# Patient Record
Sex: MALE | Race: WHITE | Employment: FULL TIME | URBAN - METROPOLITAN AREA
[De-identification: names, ages, dates, MRNs, and addresses within clinical notes are randomized per-mention and may not be internally consistent; named-entity substitution may affect disease eponyms.]

---

## 2023-05-25 LAB
ERYTHROCYTE DISTRIBUTION WIDTH (RATIO) BY AUTOMATED COUNT: 14.2 % (ref 11.5–14.5)
ERYTHROCYTE MEAN CORPUSCULAR HEMOGLOBIN CONCENTRATION (G/DL) BY AUTOMATED: 31.4 G/DL (ref 32–36)
ERYTHROCYTE MEAN CORPUSCULAR VOLUME (FL) BY AUTOMATED COUNT: 104 FL (ref 80–100)
ERYTHROCYTES (10*6/UL) IN BLOOD BY AUTOMATED COUNT: 2.73 X10E12/L (ref 4.5–5.9)
HEMATOCRIT (%) IN BLOOD BY AUTOMATED COUNT: 28.3 % (ref 41–52)
HEMOGLOBIN (G/DL) IN BLOOD: 8.9 G/DL (ref 13.5–17.5)
LEUKOCYTES (10*3/UL) IN BLOOD BY AUTOMATED COUNT: 6.4 X10E9/L (ref 4.4–11.3)
PLATELETS (10*3/UL) IN BLOOD AUTOMATED COUNT: 233 X10E9/L (ref 150–450)

## 2023-08-30 PROBLEM — I10 HTN (HYPERTENSION): Status: ACTIVE | Noted: 2023-08-30

## 2023-08-30 PROBLEM — H47.20 OPTIC ATROPHY OF RIGHT EYE: Status: ACTIVE | Noted: 2023-08-30

## 2023-08-30 PROBLEM — I48.91 A-FIB (MULTI): Status: ACTIVE | Noted: 2023-08-30

## 2023-08-30 PROBLEM — R70.0 ESR RAISED: Status: ACTIVE | Noted: 2023-08-30

## 2023-08-30 PROBLEM — R52 SHOOTING PAIN: Status: ACTIVE | Noted: 2023-08-30

## 2023-08-30 PROBLEM — K22.70 BARRETTS ESOPHAGUS: Status: ACTIVE | Noted: 2023-08-30

## 2023-08-30 PROBLEM — M31.6 GCA (GIANT CELL ARTERITIS) (MULTI): Status: ACTIVE | Noted: 2023-08-30

## 2023-08-30 PROBLEM — K25.9 GASTRIC ULCER: Status: ACTIVE | Noted: 2023-08-30

## 2023-08-30 PROBLEM — D64.9 ANEMIA: Status: ACTIVE | Noted: 2023-08-30

## 2023-08-30 RX ORDER — ASCORBIC ACID 500 MG
TABLET,CHEWABLE ORAL
COMMUNITY

## 2023-08-30 RX ORDER — KETOCONAZOLE 20 MG/ML
SHAMPOO, SUSPENSION TOPICAL
COMMUNITY
Start: 2023-03-25 | End: 2024-01-09 | Stop reason: WASHOUT

## 2023-08-30 RX ORDER — LANOLIN ALCOHOL/MO/W.PET/CERES
100 CREAM (GRAM) TOPICAL
COMMUNITY
End: 2024-01-09 | Stop reason: WASHOUT

## 2023-08-30 RX ORDER — ALLOPURINOL 100 MG/1
100 TABLET ORAL
COMMUNITY
Start: 2021-02-25

## 2023-08-30 RX ORDER — CARVEDILOL 12.5 MG/1
1 TABLET ORAL 2 TIMES DAILY
COMMUNITY
End: 2024-01-09 | Stop reason: WASHOUT

## 2023-08-30 RX ORDER — INSULIN DETEMIR 100 [IU]/ML
3 INJECTION, SOLUTION SUBCUTANEOUS
COMMUNITY
Start: 2023-02-10

## 2023-08-30 RX ORDER — PEN NEEDLE, DIABETIC 31 GX5/16"
NEEDLE, DISPOSABLE MISCELLANEOUS
COMMUNITY
Start: 2023-02-12

## 2023-08-30 RX ORDER — PANTOPRAZOLE SODIUM 40 MG/1
1 TABLET, DELAYED RELEASE ORAL DAILY
COMMUNITY

## 2023-08-30 RX ORDER — INSULIN ASPART 100 [IU]/ML
INJECTION, SOLUTION INTRAVENOUS; SUBCUTANEOUS
COMMUNITY
Start: 2022-04-17

## 2023-08-30 RX ORDER — SITAGLIPTIN 25 MG/1
1 TABLET, FILM COATED ORAL DAILY
COMMUNITY
Start: 2021-05-28

## 2023-08-30 RX ORDER — ACETAMINOPHEN 325 MG/1
TABLET ORAL
COMMUNITY

## 2023-08-30 RX ORDER — AMLODIPINE BESYLATE 5 MG/1
5 TABLET ORAL
COMMUNITY
Start: 2022-10-05

## 2023-08-30 RX ORDER — CARVEDILOL 6.25 MG/1
12.5 TABLET ORAL 2 TIMES DAILY
COMMUNITY
Start: 2022-01-12

## 2023-08-30 RX ORDER — BLOOD SUGAR DIAGNOSTIC
STRIP MISCELLANEOUS
COMMUNITY
Start: 2021-03-05

## 2023-08-30 RX ORDER — TAMSULOSIN HYDROCHLORIDE 0.4 MG/1
0.4 CAPSULE ORAL
COMMUNITY
End: 2024-01-09 | Stop reason: WASHOUT

## 2023-08-30 RX ORDER — FUROSEMIDE 20 MG/1
20 TABLET ORAL DAILY
COMMUNITY
Start: 2022-10-11

## 2023-10-03 ENCOUNTER — LAB (OUTPATIENT)
Dept: LAB | Facility: LAB | Age: 83
End: 2023-10-03
Payer: MEDICARE

## 2023-10-03 DIAGNOSIS — M31.6 OTHER GIANT CELL ARTERITIS (MULTI): Primary | ICD-10-CM

## 2023-10-03 LAB
CRP SERPL-MCNC: 0.75 MG/DL
ERYTHROCYTE [SEDIMENTATION RATE] IN BLOOD BY WESTERGREN METHOD: 49 MM/H (ref 0–20)

## 2023-10-03 PROCEDURE — 36415 COLL VENOUS BLD VENIPUNCTURE: CPT

## 2023-10-03 PROCEDURE — 86140 C-REACTIVE PROTEIN: CPT

## 2023-10-03 PROCEDURE — 85652 RBC SED RATE AUTOMATED: CPT

## 2023-10-06 ENCOUNTER — OFFICE VISIT (OUTPATIENT)
Dept: OPHTHALMOLOGY | Facility: CLINIC | Age: 83
End: 2023-10-06
Payer: MEDICARE

## 2023-10-06 DIAGNOSIS — H47.20 OPTIC ATROPHY OF RIGHT EYE: Primary | ICD-10-CM

## 2023-10-06 DIAGNOSIS — M31.6 GCA (GIANT CELL ARTERITIS) (MULTI): ICD-10-CM

## 2023-10-06 PROCEDURE — 99214 OFFICE O/P EST MOD 30 MIN: CPT | Performed by: PSYCHIATRY & NEUROLOGY

## 2023-10-06 PROCEDURE — 1160F RVW MEDS BY RX/DR IN RCRD: CPT | Performed by: PSYCHIATRY & NEUROLOGY

## 2023-10-06 PROCEDURE — 92083 EXTENDED VISUAL FIELD XM: CPT | Performed by: PSYCHIATRY & NEUROLOGY

## 2023-10-06 PROCEDURE — 1159F MED LIST DOCD IN RCRD: CPT | Performed by: PSYCHIATRY & NEUROLOGY

## 2023-10-06 PROCEDURE — 92133 CPTRZD OPH DX IMG PST SGM ON: CPT | Performed by: PSYCHIATRY & NEUROLOGY

## 2023-10-06 ASSESSMENT — CONF VISUAL FIELD: OD_INFERIOR_NASAL_RESTRICTION: 1

## 2023-10-06 ASSESSMENT — ENCOUNTER SYMPTOMS
CARDIOVASCULAR NEGATIVE: 0
ENDOCRINE NEGATIVE: 0
HEMATOLOGIC/LYMPHATIC NEGATIVE: 0
RESPIRATORY NEGATIVE: 0
GASTROINTESTINAL NEGATIVE: 0
ALLERGIC/IMMUNOLOGIC NEGATIVE: 0
MUSCULOSKELETAL NEGATIVE: 0
CONSTITUTIONAL NEGATIVE: 0
PSYCHIATRIC NEGATIVE: 0
EYES NEGATIVE: 1
NEUROLOGICAL NEGATIVE: 0

## 2023-10-06 ASSESSMENT — EXTERNAL EXAM - RIGHT EYE: OD_EXAM: NORMAL

## 2023-10-06 ASSESSMENT — CUP TO DISC RATIO
OD_RATIO: 0.1
OS_RATIO: 0.1

## 2023-10-06 ASSESSMENT — VISUAL ACUITY
METHOD: SNELLEN - LINEAR
OD_PH_SC: NO IMPROVEMENT
OS_SC: 20/20
OD_SC: 20/70+2

## 2023-10-06 ASSESSMENT — TONOMETRY
OD_IOP_MMHG: 12
OS_IOP_MMHG: 12
IOP_METHOD: GOLDMANN APPLANATION

## 2023-10-06 ASSESSMENT — SLIT LAMP EXAM - LIDS
COMMENTS: NORMAL
COMMENTS: NORMAL

## 2023-10-06 ASSESSMENT — EXTERNAL EXAM - LEFT EYE: OS_EXAM: NORMAL

## 2023-10-06 NOTE — PROGRESS NOTES
Assessment and Plan    03/17/2022 MRI brain with contrast, which I personally reviewed previously, shows bihemispheric mild white matter FLAIR changes and an area of left parietal cortical encephalomalacia consistent with prior ischemia.    03/07/2022 pathology “RIGHT TEMPORAL ARTERY, BIOPSY: --NEGATIVE FOR ARTERITIS --ATHEROSCLEROSIS”    Lab Results   Component Value Date/Time    SEDRATE 49 (H) 10/03/2023 1131    SEDRATE 26 (H) 02/17/2023 1532    SEDRATE 33 (H) 10/10/2022 1151    SEDRATE 53 (H) 08/31/2022 1350    SEDRATE 25 (H) 06/17/2022 1143    SEDRATE 3 04/08/2022 0600    SEDRATE 10 03/04/2022 1441    SEDRATE 17 02/21/2022 1444    SEDRATE 45 (H) 01/24/2022 1514    CRP 0.75 10/03/2023 1131    CRP 0.18 02/17/2023 1532    CRP 0.93 10/10/2022 1151    CRP 0.59 08/31/2022 1350    CRP 0.35 06/17/2022 1143    CRP 1.56 (A) 04/08/2022 0600    CRP 1.38 (A) 03/04/2022 1441    CRP 0.49 02/21/2022 1444    CRP 0.57 01/24/2022 1514     07/06/2022 ESR 34. CRP 0.43 mg/dL. ([facility])  05/04/2022 ESR 50. CRP 0.35 mg/dL.  04/29/2022 RF <10. MATT negative with negative CALVIN. ANCA, [place]-3, MPO negative.    10/6/2023 OCT RNFL OD 41 & OS 82. (Stable right eye (OD), small decrease left eye (OS))  02/23/2023 OCT RNFL OD 36 & OS 85 (stable OD, about stable OS)  10/27/2022 OCT RNFL OD 36 & OS 88 (lower OD, about stable OS)   07/18/2022 OCT RNFL OD 40 & OS 95. (stable)  06/16/2022 OCT RNFL OD 42 & OS 93. (lower OD progressed to atrophy & higher OS)  03/04/2022 OCT RNFL  & OS 84. (reduced edema OD & stable OS)  02/21/2022 OCT RNFL  & OS 86.    10/6/2023 HVF 24-2 OD fovea 17, generalized depression MD -24.93 & OS fovea 33, rim artifact MD -8.89.   02/23/2023 HVF 24-2 OD fovea <0, generalized depression MD -22.49 & OS fovea 15, scatter MD -0.72.  10/27/2022 HVF 24-2 OD fovea 29, generalized depression with central sparing MD -25.92 & OS fovea 33, scatter MD -2.78   07/18/2022 HVF 24-2 OD stimulus V, fovea 35,  generalized depression & OS fovea 36, scatter MD -2.62.  06/16/2022 HVF 24-2 OD fovea 27, generalized depression MD -25.82 & OS fovea 34, infeiror scatter MD -2.92.  03/04/2022 HVF 24-2 OD stimulus V, fovea 24, generalized, but embeded inferior worse than superior nasal loss  & OS fovea 31, scatter MD -2.43.  02/21/2022 HVF 24-2 OD stimulus V, fovea 28, generalized depression & OS fovea 35, scatter MD -3.43.    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    Findings are similar to previous visits with right optic atrophy and maintained good left eye vision. Recent laboratory studies showed more elevated ESR with normal CRP. I remain concerned for an inflammatory disease such as giant cell arteritis even with negative temporal artery biopsy over non-arteritic anterior ischemic optic neuropathy although he does have small cup/disc ratio and vasculopathic risk factors. However, prednisone was very bad for him, and he has done reasonable well off it. We discussed the option of returning to see a rheumatologist versus monitoring.    Plan    Vasculopathic risk factor control.  ESR & CRP monitoring near next visit.  Defer prednisone after weighing of costs and benefits.  Seek immediate attention for further vision loss.    Follow up in 6-9 months with HVF & OCT. (dilated 02/21/2022)

## 2023-10-06 NOTE — LETTER
October 6, 2023     Honorio Avila MD  [address] [facility] [place] [address]    Patient: Jacob Massey   YOB: 1940   Date of Visit: 10/6/2023     Dear Dr. Honorio Avila MD:    I am writing to share my findings regarding our shared patient Jacob Massey from his visit with me on 10/6/2023.    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    Vision has been stable. He has adjusted more to the vision loss. He awoke with right lower eyelid bruising a few weeks ago that resolved after a few days. This patient reports the following regarding associated symptoms: headaches: more recently happening every few days, but brief and not severe, amaurosis fugax: no, scalp tenderness: some brief times of temple tenderness, jaw claudication: some pain, but not clear claudication pattern, fever: no, proximal myalgias: some baseline pain with times his arms have hurt waking him from sleep, last about 6 weeks ago.   Last edited by Travis Stoll MD PhD on 10/6/2023  2:10 PM.        Diagnoses    Diagnoses and all orders for this visit:  Optic atrophy of right eye (Primary)  -     OCT, Optic Nerve - OU - Both Eyes  -     Marroquin Visual Field - OU - Both Eyes  GCA (giant cell arteritis) (CMS/HCC)  -     OCT, Optic Nerve - OU - Both Eyes  -     Marroquin Visual Field - OU - Both Eyes    Assessment and Plan    03/17/2022 MRI brain with contrast, which I personally reviewed previously, shows bihemispheric mild white matter FLAIR changes and an area of left parietal cortical encephalomalacia consistent with prior ischemia.    03/07/2022 pathology “RIGHT TEMPORAL ARTERY, BIOPSY: --NEGATIVE FOR ARTERITIS --ATHEROSCLEROSIS”    Lab Results   Component  Value Date/Time    SEDRATE 49 (H) 10/03/2023 1131    SEDRATE 26 (H) 02/17/2023 1532    SEDRATE 33 (H) 10/10/2022 1151    SEDRATE 53 (H) 08/31/2022 1350    SEDRATE 25 (H) 06/17/2022 1143    SEDRATE 3 04/08/2022 0600    SEDRATE 10 03/04/2022 1441    SEDRATE 17 02/21/2022 1444    SEDRATE 45 (H) 01/24/2022 1514    CRP 0.75 10/03/2023 1131    CRP 0.18 02/17/2023 1532    CRP 0.93 10/10/2022 1151    CRP 0.59 08/31/2022 1350    CRP 0.35 06/17/2022 1143    CRP 1.56 (A) 04/08/2022 0600    CRP 1.38 (A) 03/04/2022 1441    CRP 0.49 02/21/2022 1444    CRP 0.57 01/24/2022 1514     07/06/2022 ESR 34. CRP 0.43 mg/dL. ([facility])  05/04/2022 ESR 50. CRP 0.35 mg/dL.  04/29/2022 RF <10. MATT negative with negative CALVIN. ANCA, [place]-3, MPO negative.    10/6/2023 OCT RNFL OD 41 & OS 82. (Stable right eye (OD), small decrease left eye (OS))  02/23/2023 OCT RNFL OD 36 & OS 85 (stable OD, about stable OS)  10/27/2022 OCT RNFL OD 36 & OS 88 (lower OD, about stable OS)   07/18/2022 OCT RNFL OD 40 & OS 95. (stable)  06/16/2022 OCT RNFL OD 42 & OS 93. (lower OD progressed to atrophy & higher OS)  03/04/2022 OCT RNFL  & OS 84. (reduced edema OD & stable OS)  02/21/2022 OCT RNFL  & OS 86.    10/6/2023 HVF 24-2 OD fovea 17, generalized depression MD -24.93 & OS fovea 33, rim artifact MD -8.89.   02/23/2023 HVF 24-2 OD fovea <0, generalized depression MD -22.49 & OS fovea 15, scatter MD -0.72.  10/27/2022 HVF 24-2 OD fovea 29, generalized depression with central sparing MD -25.92 & OS fovea 33, scatter MD -2.78   07/18/2022 HVF 24-2 OD stimulus V, fovea 35, generalized depression & OS fovea 36, scatter MD -2.62.  06/16/2022 HVF 24-2 OD fovea 27, generalized depression MD -25.82 & OS fovea 34, infeiror scatter MD -2.92.  03/04/2022 HVF 24-2 OD stimulus V, fovea 24, generalized, but embeded inferior worse than superior nasal loss  & OS fovea 31, scatter MD -2.43.  02/21/2022 HVF 24-2 OD stimulus V, fovea 28, generalized  depression & OS fovea 35, scatter MD -3.43.    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    Findings are similar to previous visits with right optic atrophy and maintained good left eye vision. Recent laboratory studies showed more elevated ESR with normal CRP. I remain concerned for an inflammatory disease such as giant cell arteritis even with negative temporal artery biopsy over non-arteritic anterior ischemic optic neuropathy although he does have small cup/disc ratio and vasculopathic risk factors. However, prednisone was very bad for him, and he has done reasonable well off it. We discussed the option of returning to see a rheumatologist versus monitoring.    Plan    Vasculopathic risk factor control.  ESR & CRP monitoring near next visit.  Defer prednisone after weighing of costs and benefits.  Seek immediate attention for further vision loss.    Follow up in 6-9 months with HVF & OCT. (dilated 02/21/2022)      Below you will find my full examination. I appreciate the opportunity to see Jacob Massey today and to share in his care with you. Please contact me if you have questions for me regarding this visit or if I can be of assistance to another of your patients with neuro-ophthalmological problems.    Sincerely,    Travis Stoll MD PhD    CC:   No Recipients      Base Eye Exam       Visual Acuity (Snellen - Linear)         Right Left    Dist sc 20/70+2 20/20    Dist ph sc no improvement               Tonometry (Goldmann Applanation, 1:14 PM)         Right Left    Pressure 12 12              Pupils         Dark Light Shape React APD    Right 5 3 Round Brisk 1.8 log units    Left 5 3 Round Brisk None              Visual Fields         Left  Right    Restrictions  Total inferior nasal deficiency              Extraocular Movement         Right Left     Full, Ortho Full, Ortho                  Additional Tests       Color         Right Left    Ishihara 0 11                  Slit Lamp and Fundus Exam       External Exam         Right Left    External Normal Normal              Slit Lamp Exam         Right Left    Lids/Lashes Normal Normal    Conjunctiva/Sclera White and quiet White and quiet    Cornea Clear Clear    Anterior Chamber Deep and quiet Deep and quiet    Iris Round and reactive Round and reactive    Lens Posterior chamber intraocular lens Posterior chamber intraocular lens    Anterior Vitreous Normal Normal              Fundus Exam         Right Left    Disc Pallor Normal    C/D Ratio 0.1 0.1    Macula Normal Normal    Vessels Normal Normal    Periphery Normal Normal

## 2023-11-21 PROBLEM — C61 MALIGNANT NEOPLASM OF PROSTATE (MULTI): Status: ACTIVE | Noted: 2017-03-15

## 2023-11-21 PROBLEM — I10 BENIGN ESSENTIAL HYPERTENSION: Status: ACTIVE | Noted: 2017-03-15

## 2024-01-09 ENCOUNTER — OFFICE VISIT (OUTPATIENT)
Dept: CARDIOLOGY | Facility: CLINIC | Age: 84
End: 2024-01-09
Payer: MEDICARE

## 2024-01-09 VITALS
WEIGHT: 203 LBS | BODY MASS INDEX: 29.06 KG/M2 | SYSTOLIC BLOOD PRESSURE: 160 MMHG | DIASTOLIC BLOOD PRESSURE: 70 MMHG | HEART RATE: 69 BPM | HEIGHT: 70 IN

## 2024-01-09 DIAGNOSIS — D64.9 ANEMIA, UNSPECIFIED TYPE: ICD-10-CM

## 2024-01-09 DIAGNOSIS — I10 HYPERTENSION, UNSPECIFIED TYPE: ICD-10-CM

## 2024-01-09 DIAGNOSIS — I48.0 PAROXYSMAL ATRIAL FIBRILLATION (MULTI): Primary | ICD-10-CM

## 2024-01-09 DIAGNOSIS — I10 BENIGN ESSENTIAL HYPERTENSION: ICD-10-CM

## 2024-01-09 PROCEDURE — 1159F MED LIST DOCD IN RCRD: CPT | Performed by: INTERNAL MEDICINE

## 2024-01-09 PROCEDURE — 3078F DIAST BP <80 MM HG: CPT | Performed by: INTERNAL MEDICINE

## 2024-01-09 PROCEDURE — 3077F SYST BP >= 140 MM HG: CPT | Performed by: INTERNAL MEDICINE

## 2024-01-09 PROCEDURE — 93000 ELECTROCARDIOGRAM COMPLETE: CPT | Performed by: INTERNAL MEDICINE

## 2024-01-09 PROCEDURE — 1036F TOBACCO NON-USER: CPT | Performed by: INTERNAL MEDICINE

## 2024-01-09 PROCEDURE — 99214 OFFICE O/P EST MOD 30 MIN: CPT | Performed by: INTERNAL MEDICINE

## 2024-01-09 NOTE — PROGRESS NOTES
Chief Complaint:   No chief complaint on file.     History Of Present Illness:    Jacob Massey is a 84 y.o. male presenting for yearly follow up.  H/o pAfib, HTN, CKD, colon CA with partial colectomy, GIB in 2022 (Hb 6.7, required 4u pRBC) who presents for follow up.    Doing well from a CV perspective - no chest pain/pressure, SOB, HERNANDEZ.  He was having some swelling in the ankles but his is gone.  Denies any significant sx from Afib - no long episodes of palpitations or fast heart rates.  Denies any blood in the stools or in the urine.  Has had a few nosebleeds but these do stop with manual compression.  Seeing his PCP Dr. Avila next week and will be having blood testing that visit.  Checks his blood pressure twice a day - this morning his BP at home was 127/  In general his blood pressures can be labile - tells me that his nephrologist is following this closely.    Unfortunately he does not have his medication list with him.       CV testing reviewed :  2/2023  BMP  k 4.4 BUN 53, crea 2.50    CBC    H&H 8.9/28.3  4/2022 TTE   EF 60-65% stage I DD, left atrium moderately dilated,     ast Recorded Vitals:  There were no vitals filed for this visit.    Past Medical History:  He has no past medical history on file.    Past Surgical History:  He has a past surgical history that includes Other surgical history (02/21/2022); Other surgical history (02/21/2022); and CT angio abdomen pelvis w and or wo IV IV contrast (4/6/2022).      Social History:  He has no history on file for tobacco use, alcohol use, and drug use.    Family History:  Family History   Problem Relation Name Age of Onset    Colon cancer Father          Allergies:  Tetracycline, Adhesive tape-silicones, and Penicillin    Outpatient Medications:  Current Outpatient Medications   Medication Instructions    acetaminophen (TylenoL) 325 mg tablet oral    allopurinol (ZYLOPRIM) 100 mg, oral    amLODIPine (NORVASC) 5 mg, oral    amlodipine-valsartan  "(Exforge) 5-160 mg tablet 1 tablet, oral, Daily    apixaban (Eliquis) 2.5 mg tablet 1 tablet, oral, 2 times daily    ascorbic acid (Vitamin C) 500 mg chewable tablet oral    BD Ultra-Fine Short Pen Needle 31 gauge x 5/16\" needle     carvedilol (Coreg) 12.5 mg tablet 1 tablet, oral, 2 times daily    carvedilol (COREG) 12.5 mg, oral, 2 times daily    fluocinonide (Lidex) 0.05 % external solution 1 APPLICATION EXTERNALLY ONCE A DAY TO SCALP ITCH AS NEEDED 30 DAYS    furosemide (LASIX) 20 mg, oral, Daily    insulin aspart (NovoLOG U-100 Insulin aspart) 100 unit/mL injection PLEASE SEE ATTACHED FOR DETAILED DIRECTIONS    Januvia 25 mg tablet 1 tablet, oral, Daily    ketoconazole (NIZOral) 2 % shampoo     Levemir FlexTouch U100 Insulin 100 unit/mL (3 mL) pen 3 mL, subcutaneous    NON FORMULARY oral, Vitamin D TABS    OneTouch Ultra Test strip USE TO TEST BLOOD SUGAR ONCE A DAY    pantoprazole (ProtoNix) 40 mg EC tablet 1 tablet, oral, Daily    tamsulosin (FLOMAX) 0.4 mg, oral    thiamine (VITAMIN B-1) 100 mg, oral    triamcinolone (Kenalog) 0.1 % cream PLEASE SEE ATTACHED FOR DETAILED DIRECTIONS    VITAMIN A ORAL oral       Physical Exam:  Physical Exam  HENT:      Head: Normocephalic.      Nose: Nose normal.      Mouth/Throat:      Mouth: Mucous membranes are moist.   Cardiovascular:      Rate and Rhythm: Normal rate and regular rhythm.      Comments: No carotid bruits  Pulmonary:      Effort: Pulmonary effort is normal.      Breath sounds: Normal breath sounds.   Abdominal:      Palpations: Abdomen is soft.   Musculoskeletal:         General: Normal range of motion.      Cervical back: Normal range of motion.   Skin:     General: Skin is warm and dry.   Neurological:      General: No focal deficit present.      Mental Status: He is alert.   Psychiatric:         Mood and Affect: Mood normal.           Last Labs:  CBC -  Lab Results   Component Value Date    WBC 6.4 05/25/2023    HGB 8.9 (L) 05/25/2023    HCT 28.3 (L) " "05/25/2023     (H) 05/25/2023     05/25/2023       CMP -  Lab Results   Component Value Date    CALCIUM 8.8 02/17/2023    PHOS 3.8 02/17/2023    PROT 4.6 (L) 04/16/2022    ALBUMIN 4.0 02/17/2023    AST 9 04/16/2022    ALT 11 04/16/2022    ALKPHOS 70 04/16/2022    BILITOT 0.4 04/16/2022       LIPID PANEL -   No results found for: \"CHOL\", \"TRIG\", \"HDL\", \"CHHDL\", \"LDLF\", \"VLDL\", \"NHDL\"    RENAL FUNCTION PANEL -   Lab Results   Component Value Date    GLUCOSE 132 (H) 02/17/2023     02/17/2023    K 4.4 02/17/2023     (H) 02/17/2023    CO2 22 02/17/2023    ANIONGAP 16 02/17/2023    BUN 53 (H) 02/17/2023    CREATININE 2.50 (H) 02/17/2023    GFRMALE 25 (A) 02/17/2023    CALCIUM 8.8 02/17/2023    PHOS 3.8 02/17/2023    ALBUMIN 4.0 02/17/2023        Lab Results   Component Value Date    HGBA1C 5.9 (A) 02/17/2023       Assessment/Plan   Problem List Items Addressed This Visit             ICD-10-CM       Cardiac and Vasculature    A-fib (CMS/HCC) - Primary I48.91     Denies significant Afib symptoms, no long episodes of palps or dizziness.  EKG today is sinus rhythm with first deg AVB and RBBB, HR 64.  -continue rate control with BB  -apixaban 2.5mg bid         HTN (hypertension) I10    Benign essential hypertension I10     Labile blood pressures - telling me this morning his SBP was 127, but this afternoon it is in the 160's.      -to have his wife call us with his blood pressure log and accurate medication list            Hematology and Neoplasia    Anemia D64.9     Prior history of anemia in 2022 requiring 4u pRBC.  No recent H/H in our system - 5/2023, Hb 8.9    -telling me he will be having bloodwork soon when seen by his PCP Dr. Avila  -also we will give him out fax number to obtain most recent bloodwork                  "

## 2024-01-09 NOTE — PATIENT INSTRUCTIONS
Thanks for following up in office today.    1)  Before we make any changes to your medications.  I want your wife to call with what medications you are currently taking along with a list of your blood pressure readings for the last week.      2)  I am going to keep you on the eliquis for now  but you need to let me know if you are having any bleeding.    Your last blood test that shows your blood count was in  May 2023 and it was low.  Please have your last blood count test faxed to our office [phone].   3)  Please continue your cardiac medications as prescribed.    Follow up with --- in --- months  If you have any questions, please call [phone] and choose option for Dr. Aguirre's nurse Heidi Jordan

## 2024-01-09 NOTE — ASSESSMENT & PLAN NOTE
Labile blood pressures - telling me this morning his SBP was 127, but this afternoon it is in the 160's.      -to have his wife call us with his blood pressure log and accurate medication list

## 2024-01-09 NOTE — ASSESSMENT & PLAN NOTE
Denies significant Afib symptoms, no long episodes of palps or dizziness.  EKG today is sinus rhythm with first deg AVB and RBBB, HR 64.  -continue rate control with BB  -apixaban 2.5mg bid

## 2024-01-09 NOTE — ASSESSMENT & PLAN NOTE
Prior history of anemia in 2022 requiring 4u pRBC.  No recent H/H in our system - 5/2023, Hb 8.9    -telling me he will be having bloodwork soon when seen by his PCP Dr. Avila  -also we will give him out fax number to obtain most recent bloodwork

## 2024-06-18 PROBLEM — D63.1 ANEMIA IN STAGE 4 CHRONIC KIDNEY DISEASE (MULTI): Status: ACTIVE | Noted: 2024-06-18

## 2024-06-18 PROBLEM — N18.4 CKD (CHRONIC KIDNEY DISEASE), STAGE IV (MULTI): Status: ACTIVE | Noted: 2024-06-18

## 2024-06-18 PROBLEM — N18.4 ANEMIA IN STAGE 4 CHRONIC KIDNEY DISEASE (MULTI): Status: ACTIVE | Noted: 2024-06-18

## 2024-06-18 RX ORDER — HEPARIN 100 UNIT/ML
500 SYRINGE INTRAVENOUS AS NEEDED
Status: CANCELLED | OUTPATIENT
Start: 2024-06-19

## 2024-06-18 RX ORDER — HEPARIN SODIUM,PORCINE/PF 10 UNIT/ML
50 SYRINGE (ML) INTRAVENOUS AS NEEDED
Status: CANCELLED | OUTPATIENT
Start: 2024-06-19

## 2024-06-19 ENCOUNTER — INFUSION (OUTPATIENT)
Dept: INFUSION THERAPY | Facility: HOSPITAL | Age: 84
End: 2024-06-19
Payer: MEDICARE

## 2024-06-19 VITALS
OXYGEN SATURATION: 98 % | SYSTOLIC BLOOD PRESSURE: 132 MMHG | TEMPERATURE: 98 F | RESPIRATION RATE: 16 BRPM | HEART RATE: 66 BPM | DIASTOLIC BLOOD PRESSURE: 80 MMHG

## 2024-06-19 DIAGNOSIS — D63.1 ANEMIA IN STAGE 4 CHRONIC KIDNEY DISEASE (MULTI): Primary | ICD-10-CM

## 2024-06-19 DIAGNOSIS — N18.4 CKD (CHRONIC KIDNEY DISEASE), STAGE IV (MULTI): ICD-10-CM

## 2024-06-19 DIAGNOSIS — N18.4 ANEMIA IN STAGE 4 CHRONIC KIDNEY DISEASE (MULTI): Primary | ICD-10-CM

## 2024-06-19 PROCEDURE — 96365 THER/PROPH/DIAG IV INF INIT: CPT | Mod: INF

## 2024-06-19 PROCEDURE — 2500000004 HC RX 250 GENERAL PHARMACY W/ HCPCS (ALT 636 FOR OP/ED): Mod: JZ | Performed by: INTERNAL MEDICINE

## 2024-06-19 RX ORDER — HEPARIN SODIUM,PORCINE/PF 10 UNIT/ML
50 SYRINGE (ML) INTRAVENOUS AS NEEDED
OUTPATIENT
Start: 2024-06-19

## 2024-06-19 RX ORDER — HEPARIN 100 UNIT/ML
500 SYRINGE INTRAVENOUS AS NEEDED
OUTPATIENT
Start: 2024-06-19

## 2024-06-19 ASSESSMENT — COLUMBIA-SUICIDE SEVERITY RATING SCALE - C-SSRS
2. HAVE YOU ACTUALLY HAD ANY THOUGHTS OF KILLING YOURSELF?: NO
6. HAVE YOU EVER DONE ANYTHING, STARTED TO DO ANYTHING, OR PREPARED TO DO ANYTHING TO END YOUR LIFE?: NO
1. IN THE PAST MONTH, HAVE YOU WISHED YOU WERE DEAD OR WISHED YOU COULD GO TO SLEEP AND NOT WAKE UP?: NO

## 2024-06-19 ASSESSMENT — PATIENT HEALTH QUESTIONNAIRE - PHQ9
1. LITTLE INTEREST OR PLEASURE IN DOING THINGS: NOT AT ALL
SUM OF ALL RESPONSES TO PHQ9 QUESTIONS 1 AND 2: 0
2. FEELING DOWN, DEPRESSED OR HOPELESS: NOT AT ALL

## 2024-06-19 ASSESSMENT — PAIN SCALES - GENERAL: PAINLEVEL: 0-NO PAIN

## 2024-06-19 ASSESSMENT — ENCOUNTER SYMPTOMS
DEPRESSION: 0
LOSS OF SENSATION IN FEET: 0
OCCASIONAL FEELINGS OF UNSTEADINESS: 0

## 2024-06-26 ENCOUNTER — INFUSION (OUTPATIENT)
Dept: INFUSION THERAPY | Facility: HOSPITAL | Age: 84
End: 2024-06-26
Payer: MEDICARE

## 2024-06-26 VITALS
RESPIRATION RATE: 18 BRPM | HEART RATE: 77 BPM | SYSTOLIC BLOOD PRESSURE: 115 MMHG | TEMPERATURE: 98.2 F | OXYGEN SATURATION: 98 % | DIASTOLIC BLOOD PRESSURE: 66 MMHG

## 2024-06-26 DIAGNOSIS — D63.1 ANEMIA IN STAGE 4 CHRONIC KIDNEY DISEASE (MULTI): ICD-10-CM

## 2024-06-26 DIAGNOSIS — N18.4 ANEMIA IN STAGE 4 CHRONIC KIDNEY DISEASE (MULTI): ICD-10-CM

## 2024-06-26 PROCEDURE — 2500000004 HC RX 250 GENERAL PHARMACY W/ HCPCS (ALT 636 FOR OP/ED): Performed by: INTERNAL MEDICINE

## 2024-06-26 PROCEDURE — 96365 THER/PROPH/DIAG IV INF INIT: CPT | Mod: INF

## 2024-06-26 ASSESSMENT — COLUMBIA-SUICIDE SEVERITY RATING SCALE - C-SSRS
2. HAVE YOU ACTUALLY HAD ANY THOUGHTS OF KILLING YOURSELF?: NO
1. IN THE PAST MONTH, HAVE YOU WISHED YOU WERE DEAD OR WISHED YOU COULD GO TO SLEEP AND NOT WAKE UP?: NO
6. HAVE YOU EVER DONE ANYTHING, STARTED TO DO ANYTHING, OR PREPARED TO DO ANYTHING TO END YOUR LIFE?: NO

## 2024-06-26 ASSESSMENT — ENCOUNTER SYMPTOMS
DEPRESSION: 0
LOSS OF SENSATION IN FEET: 0
OCCASIONAL FEELINGS OF UNSTEADINESS: 0

## 2024-06-26 ASSESSMENT — PATIENT HEALTH QUESTIONNAIRE - PHQ9
2. FEELING DOWN, DEPRESSED OR HOPELESS: NOT AT ALL
SUM OF ALL RESPONSES TO PHQ9 QUESTIONS 1 AND 2: 0
1. LITTLE INTEREST OR PLEASURE IN DOING THINGS: NOT AT ALL

## 2024-06-28 RX ORDER — CARVEDILOL 12.5 MG/1
TABLET ORAL
COMMUNITY
Start: 2024-05-10

## 2024-06-28 RX ORDER — TRIAMCINOLONE ACETONIDE 1 MG/G
CREAM TOPICAL
COMMUNITY

## 2024-06-28 RX ORDER — KETOCONAZOLE 20 MG/ML
SHAMPOO, SUSPENSION TOPICAL
COMMUNITY

## 2024-06-28 RX ORDER — CHOLECALCIFEROL (VITAMIN D3) 50 MCG
2000 TABLET ORAL DAILY
COMMUNITY

## 2024-06-28 RX ORDER — FERROUS SULFATE 325(65) MG
325 TABLET ORAL 2 TIMES DAILY
COMMUNITY

## 2024-06-28 RX ORDER — FLASH GLUCOSE SENSOR
KIT MISCELLANEOUS
COMMUNITY
Start: 2024-05-16

## 2024-07-05 ENCOUNTER — LAB (OUTPATIENT)
Dept: LAB | Facility: LAB | Age: 84
End: 2024-07-05
Payer: MEDICARE

## 2024-07-05 DIAGNOSIS — H47.20 OPTIC ATROPHY OF RIGHT EYE: ICD-10-CM

## 2024-07-05 DIAGNOSIS — M31.6 GCA (GIANT CELL ARTERITIS) (MULTI): ICD-10-CM

## 2024-07-05 LAB
CRP SERPL-MCNC: 2.53 MG/DL
ERYTHROCYTE [SEDIMENTATION RATE] IN BLOOD BY WESTERGREN METHOD: 24 MM/H (ref 0–20)

## 2024-07-05 PROCEDURE — 82607 VITAMIN B-12: CPT

## 2024-07-05 PROCEDURE — 82746 ASSAY OF FOLIC ACID SERUM: CPT

## 2024-07-05 PROCEDURE — 86140 C-REACTIVE PROTEIN: CPT

## 2024-07-05 PROCEDURE — 36415 COLL VENOUS BLD VENIPUNCTURE: CPT

## 2024-07-05 PROCEDURE — 85652 RBC SED RATE AUTOMATED: CPT

## 2024-07-08 NOTE — PROGRESS NOTES
Assessment and Plan    03/17/2022 MRI brain with contrast, which I personally reviewed previously, shows bihemispheric mild white matter FLAIR changes and an area of left parietal cortical encephalomalacia consistent with prior ischemia.    03/07/2022 pathology “RIGHT TEMPORAL ARTERY, BIOPSY: --NEGATIVE FOR ARTERITIS --ATHEROSCLEROSIS”    Lab Results   Component Value Date/Time    SEDRATE 24 (H) 07/05/2024 1524    SEDRATE 49 (H) 10/03/2023 1131    SEDRATE 26 (H) 02/17/2023 1532    SEDRATE 33 (H) 10/10/2022 1151    SEDRATE 53 (H) 08/31/2022 1350    SEDRATE 25 (H) 06/17/2022 1143    SEDRATE 3 04/08/2022 0600    SEDRATE 10 03/04/2022 1441    SEDRATE 17 02/21/2022 1444    SEDRATE 45 (H) 01/24/2022 1514    CRP 2.53 (H) 07/05/2024 1524    CRP 0.75 10/03/2023 1131    CRP 0.18 02/17/2023 1532    CRP 0.93 10/10/2022 1151    CRP 0.59 08/31/2022 1350    CRP 0.35 06/17/2022 1143    CRP 1.56 (A) 04/08/2022 0600    CRP 1.38 (A) 03/04/2022 1441    CRP 0.49 02/21/2022 1444    CRP 0.57 01/24/2022 1514     07/06/2022 ESR 34. CRP 0.43 mg/dL. ([facility])  05/04/2022 ESR 50. CRP 0.35 mg/dL.  04/29/2022 RF <10. MATT negative with negative CALVIN. ANCA, [place]-3, MPO negative.    07/09/2024 OCT RNFL OD 34 & OS 83. (Stable)  10/06/2023 OCT RNFL OD 41 & OS 82. (Stable right eye (OD), small decrease left eye (OS))  02/23/2023 OCT RNFL OD 36 & OS 85 (stable OD, about stable OS)  10/27/2022 OCT RNFL OD 36 & OS 88 (lower OD, about stable OS)   07/18/2022 OCT RNFL OD 40 & OS 95. (stable)  06/16/2022 OCT RNFL OD 42 & OS 93. (lower OD progressed to atrophy & higher OS)  03/04/2022 OCT RNFL  & OS 84. (reduced edema OD & stable OS)  02/21/2022 OCT RNFL  & OS 86.    07/09/2024 HVF 24-2 OD stimulus V, fovea 28, inferior altitudinal & superior arcuate & OS fovea 37, wnl MD -0.17.  10/06/2023 HVF 24-2 OD fovea 17, generalized depression MD -24.93 & OS fovea 33, rim artifact MD -8.89.   02/23/2023 HVF 24-2 OD fovea <0,  generalized depression MD -22.49 & OS fovea 15, scatter MD -0.72.  10/27/2022 HVF 24-2 OD fovea 29, generalized depression with central sparing MD -25.92 & OS fovea 33, scatter MD -2.78   07/18/2022 HVF 24-2 OD stimulus V, fovea 35, generalized depression & OS fovea 36, scatter MD -2.62.  06/16/2022 HVF 24-2 OD fovea 27, generalized depression MD -25.82 & OS fovea 34, infeiror scatter MD -2.92.  03/04/2022 HVF 24-2 OD stimulus V, fovea 24, generalized, but embeded inferior worse than superior nasal loss  & OS fovea 31, scatter MD -2.43.  02/21/2022 HVF 24-2 OD stimulus V, fovea 28, generalized depression & OS fovea 35, scatter MD -3.43.    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    He has similar poor right eye vision with a large relative afferent pupillary defect and optic atrophy. Recent laboratory studies again show inflammatory marker elevation, now with C-reactive protein elevated. I had been concerned about giant cell arteritis and arteritic anterior ischemic optic neuropathy given pallid edema at onset, but pallid edema also can occur in non-arteritic anterior ischemic optic neuropathy, particularly in cases of chronic kidney disease, although seen with end stage kidney disease in my experience and reading. Anemia also is pre-disposing to non-arteritic anterior ischemic optic neuropathy. I think the prolonged time of not developing other giant cell arteritis findings makes it unlikely, but I still have concerns for this event as an atypical non-arteritic anterior ischemic optic neuropathy. I recommend evaluation of the anemia in particular. Although anemia of chronic disease is possible, the high MCV raises other concerns for a nutritional  etiology. I also had not looked in the past, but carotid evaluation is worthwhile, too.    Plan    Evaluate and treat anemia, checking B12 and folate.  Hematology referral.  Check carotid duplex ultrasound.  Vasculopathic risk factor control.  Seek immediate attention for further vision loss.    Follow up in 6-9 months with HVF & OCT. (dilated 7/9/2024)

## 2024-07-09 ENCOUNTER — APPOINTMENT (OUTPATIENT)
Dept: OPHTHALMOLOGY | Facility: CLINIC | Age: 84
End: 2024-07-09
Payer: MEDICARE

## 2024-07-09 DIAGNOSIS — H47.20 OPTIC ATROPHY OF RIGHT EYE: Primary | ICD-10-CM

## 2024-07-09 DIAGNOSIS — Z09 ENCOUNTER FOR FOLLOW-UP EXAMINATION AFTER COMPLETED TREATMENT FOR CONDITIONS OTHER THAN MALIGNANT NEOPLASM: ICD-10-CM

## 2024-07-09 LAB
FOLATE SERPL-MCNC: >22.3 NG/ML
VIT B12 SERPL-MCNC: 875 PG/ML (ref 211–911)

## 2024-07-09 PROCEDURE — 99214 OFFICE O/P EST MOD 30 MIN: CPT | Performed by: PSYCHIATRY & NEUROLOGY

## 2024-07-09 PROCEDURE — 92133 CPTRZD OPH DX IMG PST SGM ON: CPT | Performed by: PSYCHIATRY & NEUROLOGY

## 2024-07-09 PROCEDURE — 92083 EXTENDED VISUAL FIELD XM: CPT | Performed by: PSYCHIATRY & NEUROLOGY

## 2024-07-09 ASSESSMENT — ENCOUNTER SYMPTOMS
CONSTITUTIONAL NEGATIVE: 0
EYES NEGATIVE: 1
ALLERGIC/IMMUNOLOGIC NEGATIVE: 0
NEUROLOGICAL NEGATIVE: 0
PSYCHIATRIC NEGATIVE: 0
MUSCULOSKELETAL NEGATIVE: 0
RESPIRATORY NEGATIVE: 0
HEMATOLOGIC/LYMPHATIC NEGATIVE: 0
GASTROINTESTINAL NEGATIVE: 0
ENDOCRINE NEGATIVE: 0
CARDIOVASCULAR NEGATIVE: 0

## 2024-07-09 ASSESSMENT — CONF VISUAL FIELD
OD_INFERIOR_NASAL_RESTRICTION: 1
OS_NORMAL: 1
OS_SUPERIOR_NASAL_RESTRICTION: 0
OS_SUPERIOR_TEMPORAL_RESTRICTION: 0
OS_INFERIOR_TEMPORAL_RESTRICTION: 0
OS_INFERIOR_NASAL_RESTRICTION: 0

## 2024-07-09 ASSESSMENT — CUP TO DISC RATIO
OD_RATIO: 0.1
OS_RATIO: 0.1

## 2024-07-09 ASSESSMENT — VISUAL ACUITY
OD_SC: 20/100
METHOD: SNELLEN - LINEAR
OS_SC: 20/20

## 2024-07-09 ASSESSMENT — EXTERNAL EXAM - LEFT EYE: OS_EXAM: NORMAL

## 2024-07-09 ASSESSMENT — EXTERNAL EXAM - RIGHT EYE: OD_EXAM: NORMAL

## 2024-07-09 ASSESSMENT — TONOMETRY
IOP_METHOD: GOLDMANN APPLANATION
OS_IOP_MMHG: 14
OD_IOP_MMHG: 14

## 2024-07-09 ASSESSMENT — SLIT LAMP EXAM - LIDS
COMMENTS: NORMAL
COMMENTS: NORMAL

## 2024-07-11 ENCOUNTER — APPOINTMENT (OUTPATIENT)
Dept: CARDIOLOGY | Facility: CLINIC | Age: 84
End: 2024-07-11
Payer: MEDICARE

## 2024-07-11 VITALS
SYSTOLIC BLOOD PRESSURE: 114 MMHG | HEART RATE: 80 BPM | DIASTOLIC BLOOD PRESSURE: 56 MMHG | HEIGHT: 70 IN | BODY MASS INDEX: 27.77 KG/M2 | OXYGEN SATURATION: 99 % | WEIGHT: 194 LBS

## 2024-07-11 DIAGNOSIS — I48.0 PAROXYSMAL ATRIAL FIBRILLATION (MULTI): Primary | ICD-10-CM

## 2024-07-11 DIAGNOSIS — D64.9 ANEMIA, UNSPECIFIED TYPE: ICD-10-CM

## 2024-07-11 DIAGNOSIS — I10 BENIGN ESSENTIAL HYPERTENSION: ICD-10-CM

## 2024-07-11 DIAGNOSIS — I10 HYPERTENSION, UNSPECIFIED TYPE: ICD-10-CM

## 2024-07-11 PROCEDURE — 1036F TOBACCO NON-USER: CPT | Performed by: PHYSICIAN ASSISTANT

## 2024-07-11 PROCEDURE — 3074F SYST BP LT 130 MM HG: CPT | Performed by: PHYSICIAN ASSISTANT

## 2024-07-11 PROCEDURE — 1160F RVW MEDS BY RX/DR IN RCRD: CPT | Performed by: PHYSICIAN ASSISTANT

## 2024-07-11 PROCEDURE — 1159F MED LIST DOCD IN RCRD: CPT | Performed by: PHYSICIAN ASSISTANT

## 2024-07-11 PROCEDURE — 99214 OFFICE O/P EST MOD 30 MIN: CPT | Performed by: PHYSICIAN ASSISTANT

## 2024-07-11 PROCEDURE — 3078F DIAST BP <80 MM HG: CPT | Performed by: PHYSICIAN ASSISTANT

## 2024-07-11 ASSESSMENT — ENCOUNTER SYMPTOMS
DYSURIA: 0
ABDOMINAL PAIN: 0
NAUSEA: 0
WHEEZING: 0
FEVER: 0
SHORTNESS OF BREATH: 1
PALPITATIONS: 0
DIARRHEA: 0
DYSPNEA ON EXERTION: 1
VOMITING: 0
ORTHOPNEA: 0
WEAKNESS: 0

## 2024-07-11 NOTE — PROGRESS NOTES
Cardiology Follow Up  Chief Complaint:   Patient is here for 6 month office visit.      History Of Present Illness:    Jacob Massey is a 84 y.o. male presenting for yearly follow up.  H/o pAfib, HTN, CKD, colon CA with partial colectomy, GIB in 2022 (Hb 6.7, required 4u pRBC) who presents for follow up.   Doing well from a CV perspective - no chest pain/pressure, SOB, HERNANDEZ.  He was having some swelling in the ankles but his is gone.  Denies any significant sx from Afib - no long episodes of palpitations or fast heart rates.  Denies any blood in the stools or in the urine.  Has had a few nosebleeds but these do stop with manual compression.  Seeing his PCP Dr. Avila next week and will be having blood testing that visit.  Checks his blood pressure twice a day - this morning his BP at home was 127/  In general his blood pressures can be labile - tells me that his nephrologist is following this closely.    Unfortunately he does not have his medication list with him.       CV testing reviewed :  2/2023  BMP  k 4.4 BUN 53, crea 2.50    CBC    H&H 8.9/28.3  4/2022 TTE   EF 60-65% stage I DD, left atrium moderately dilated,   7-11-24: This is a 84-year-old patient known to Dr. Aguirre.  Above history as noted.  Presents with his wife today for 6-month follow-up.  Patient more recently has just been very frustrated as he is feeling very weak, fatigued and has no energy.  Because of this his activity level has plummeted and he admits to being very inactive.  He is not having any specific chest pain or symptoms of atrial fibrillation.  He does have a history of GI bleeding going back to 2022 for which she needed to have a partial colectomy.  Patient subsequently was found to have gastric and duodenal ulcers but on repeat EGD in June 2022 those had healed but he had significant esophagitis.  Now the patient tells me that despite iron infusions his blood counts continue to be low and in the last couple of weeks he has had  "episodes of bright red blood per rectum like he had had previously.  Again no specific cardiac complaints.  He is on Eliquis low-dose twice daily and his last hemoglobin as best I can tell was 9.7.     Last Recorded Vitals:  Vitals:    07/11/24 1222   BP: 114/56   BP Location: Left arm   Patient Position: Sitting   BP Cuff Size: Adult   Pulse: 80   SpO2: 99%   Weight: 88 kg (194 lb)   Height: 1.778 m (5' 10\")       Past Medical History:  He has a past medical history of Anemia, Atrial fibrillation (Multi), Chronic kidney disease, CKD (chronic kidney disease), stage IV (Multi), Colon cancer (Multi), Diabetes mellitus (Multi), GERD (gastroesophageal reflux disease), Hypertension, Prostate cancer (Multi), and Skin cancer.    Past Surgical History:  He has a past surgical history that includes Other surgical history (02/21/2022); Other surgical history (02/21/2022); CT angio abdomen pelvis w and or wo IV IV contrast (04/06/2022); Cataract extraction; Tonsillectomy; Cholecystectomy; Colonoscopy; and Upper gastrointestinal endoscopy.      Social History:  He reports that he has quit smoking. His smoking use included cigarettes. He has never used smokeless tobacco. He reports that he does not currently use alcohol. He reports that he does not use drugs.    Family History:  Family History   Problem Relation Name Age of Onset    Colon cancer Father          Allergies:  Tetracycline, Adhesive tape-silicones, Penicillin, Penicillin v potassium, and Tetracycline hcl    Outpatient Medications:  Current Outpatient Medications   Medication Instructions    acetaminophen (TylenoL) 325 mg tablet oral    allopurinol (ZYLOPRIM) 100 mg, oral    amLODIPine (NORVASC) 5 mg, oral    amlodipine-valsartan (Exforge) 5-160 mg tablet 1 tablet, oral, Daily    apixaban (Eliquis) 2.5 mg tablet 1 tablet, oral, 2 times daily    ascorbic acid (Vitamin C) 500 mg chewable tablet oral    carvedilol (Coreg) 12.5 mg tablet take 1 tablet by mouth twice a " day with food for 90 days    carvedilol (COREG) 12.5 mg, oral, 2 times daily    cholecalciferol (VITAMIN D-3) 2,000 Units, oral, Daily    ferrous sulfate (325 mg ferrous sulfate) 325 mg, oral, 2 times daily    FreeStyle Tal 2 Sensor kit as directed    furosemide (LASIX) 20 mg, oral, Daily    Januvia 25 mg tablet 1 tablet, oral, Daily    ketoconazole (NIZOral) 2 % shampoo as directed Externally 2-3 x weekly as needed for 30 days    OneTouch Ultra Test strip USE TO TEST BLOOD SUGAR ONCE A DAY    pantoprazole (ProtoNix) 40 mg EC tablet 1 tablet, oral, Daily    triamcinolone (Kenalog) 0.1 % cream 1 application to affected area Externally Twice a day as needed for active rash on body, not face, use sporadically contains steroid for 30 days    VITAMIN A ORAL oral     Review of Systems   Constitutional: Negative for fever and malaise/fatigue.        Fatigued/very inactive   Cardiovascular:  Positive for dyspnea on exertion. Negative for chest pain, orthopnea and palpitations.   Respiratory:  Positive for shortness of breath. Negative for wheezing.    Skin:  Negative for itching and rash.   Gastrointestinal:  Negative for abdominal pain, diarrhea, nausea and vomiting.        In last 2 weeks has had BRBPR   Genitourinary:  Negative for dysuria.   Neurological:  Negative for weakness.      Physical Exam  Constitutional:       General: He is not in acute distress.     Appearance: Normal appearance.   HENT:      Mouth/Throat:      Mouth: Mucous membranes are moist.   Neck:      Comments: No JVD  Cardiovascular:      Rate and Rhythm: Normal rate and regular rhythm.      Heart sounds: Normal heart sounds. No murmur heard.  Abdominal:      General: Abdomen is flat. Bowel sounds are normal.      Palpations: Abdomen is soft.   Musculoskeletal:         General: No swelling.   Skin:     General: Skin is warm and dry.   Neurological:      Mental Status: He is alert and oriented to person, place, and time.   Psychiatric:         Mood  "and Affect: Mood normal.           Last Labs:  CBC -  Lab Results   Component Value Date    WBC 6.4 05/25/2023    HGB 8.9 (L) 05/25/2023    HCT 28.3 (L) 05/25/2023     (H) 05/25/2023     05/25/2023       CMP -  Lab Results   Component Value Date    CALCIUM 8.8 02/17/2023    PHOS 3.8 02/17/2023    PROT 4.6 (L) 04/16/2022    ALBUMIN 4.0 02/17/2023    AST 9 04/16/2022    ALT 11 04/16/2022    ALKPHOS 70 04/16/2022    BILITOT 0.4 04/16/2022       LIPID PANEL -   No results found for: \"CHOL\", \"TRIG\", \"HDL\", \"CHHDL\", \"LDLF\", \"VLDL\", \"NHDL\"    RENAL FUNCTION PANEL -   Lab Results   Component Value Date    GLUCOSE 132 (H) 02/17/2023     02/17/2023    K 4.4 02/17/2023     (H) 02/17/2023    CO2 22 02/17/2023    ANIONGAP 16 02/17/2023    BUN 53 (H) 02/17/2023    CREATININE 2.50 (H) 02/17/2023    GFRMALE 25 (A) 02/17/2023    CALCIUM 8.8 02/17/2023    PHOS 3.8 02/17/2023    ALBUMIN 4.0 02/17/2023        Lab Results   Component Value Date    HGBA1C 5.9 (A) 02/17/2023       Last Cardiology Tests:  Echo:  CONCLUSIONS:   1. The left ventricular systolic function is normal with a 60-65% estimated ejection fraction.   2. Spectral Doppler shows an impaired relaxation pattern of left ventricular diastolic filling.   3. The left atrium is mild to moderately dilated.      No recent results to review    Assessment/Plan   Problem List Items Addressed This Visit             ICD-10-CM       Cardiac and Vasculature    A-fib (Multi) - Primary I48.91    Relevant Medications    carvedilol (Coreg) 12.5 mg tablet    HTN (hypertension) I10    Benign essential hypertension I10       Hematology and Neoplasia    Anemia D64.9   PAF--by physical examination maintaining sinus rhythm.  He is on low-dose Eliquis but we may eventually have to hold that if his blood counts continue to drop or if he has ongoing bleeding.  I think biggest help at this time is to refer back to GI as the patient may need scoped again.  Again most recent " hemoglobin 9.7.  Fatigue/shortness of breath--again this is not really a new issue but the patient has continued to be anemic and when I last saw him his hemoglobin was 8.9.  He has received iron infusions but yet continues to be anemic.  Has had some active what sounds like lower GI bleeding with a history of colon cancer and he needs to be referred back to GI ASAP.  Otherwise I believe the patient is in rhythm and he has no signs or symptoms of CHF.  He has a history of preserved LV systolic function.  Hypertension--blood pressures have been at times on the low side which might be contributing to #2.  His amlodipine has been discontinued and his blood pressures have remained well-controlled off of amlodipine.  CKD--it is possible that some of his anemia is also from chronic kidney disease but his creatinine when last measured was 2.5.  He follows with nephrology and they have been ordering the iron infusion but interestingly his red blood cell indices do not indicate that this is iron deficiency.  Vitamin B12 level was within normal limits.  Continue to follow with nephrology.  Overall patient is stable from a cardiac standpoint but has had some lower blood pressure so amlodipine is discontinued.  He remains anemic with history of colon cancer and needs referred back to gastroenterology to find out if he has new source of bleeding.  Patient very frustrated as nobody seems to be able to tell him what is wrong or why he does not feel well so we need to try to help this gentleman get some answers.  Will otherwise arrange follow-up with Dr. Aguirre in 6 months time but for now we will refer back to GI as soon as possible.      Joe Mejia PA-C  7/11/2024  12:37 PM

## 2024-07-29 ENCOUNTER — APPOINTMENT (OUTPATIENT)
Dept: GASTROENTEROLOGY | Facility: CLINIC | Age: 84
End: 2024-07-29
Payer: MEDICARE

## 2024-07-29 VITALS
HEIGHT: 70 IN | SYSTOLIC BLOOD PRESSURE: 127 MMHG | DIASTOLIC BLOOD PRESSURE: 75 MMHG | HEART RATE: 74 BPM | BODY MASS INDEX: 26.92 KG/M2 | OXYGEN SATURATION: 98 % | WEIGHT: 188 LBS

## 2024-07-29 DIAGNOSIS — D64.9 ANEMIA, UNSPECIFIED TYPE: ICD-10-CM

## 2024-07-29 DIAGNOSIS — K62.5 RECTAL BLEEDING: Primary | ICD-10-CM

## 2024-07-29 PROCEDURE — 1159F MED LIST DOCD IN RCRD: CPT | Performed by: PHYSICIAN ASSISTANT

## 2024-07-29 PROCEDURE — 1160F RVW MEDS BY RX/DR IN RCRD: CPT | Performed by: PHYSICIAN ASSISTANT

## 2024-07-29 PROCEDURE — 1036F TOBACCO NON-USER: CPT | Performed by: PHYSICIAN ASSISTANT

## 2024-07-29 PROCEDURE — 99214 OFFICE O/P EST MOD 30 MIN: CPT | Performed by: PHYSICIAN ASSISTANT

## 2024-07-29 PROCEDURE — 3078F DIAST BP <80 MM HG: CPT | Performed by: PHYSICIAN ASSISTANT

## 2024-07-29 PROCEDURE — 3074F SYST BP LT 130 MM HG: CPT | Performed by: PHYSICIAN ASSISTANT

## 2024-07-29 RX ORDER — POLYETHYLENE GLYCOL 3350, SODIUM SULFATE ANHYDROUS, SODIUM BICARBONATE, SODIUM CHLORIDE, POTASSIUM CHLORIDE 236; 22.74; 6.74; 5.86; 2.97 G/4L; G/4L; G/4L; G/4L; G/4L
4000 POWDER, FOR SOLUTION ORAL ONCE
Qty: 4000 ML | Refills: 0 | Status: SHIPPED | OUTPATIENT
Start: 2024-07-29 | End: 2024-07-29

## 2024-07-29 NOTE — ASSESSMENT & PLAN NOTE
Patient's anemia is largely due to CKD IV with GFR 16. His most recent hemoglobin of 9.7 is actually above his baseline. Recommended he continue iron supplementation per hematology/nephrology.

## 2024-07-29 NOTE — LETTER
"July 29, 2024     Joe Mejia PA-C  [address] [facility] [address]    Patient: Jacob Massey   YOB: 1940   Date of Visit: 7/29/2024       Dear Dr. Joe Mejia PA-C:    Thank you for referring Jacob Massey to me for evaluation. Below are my notes for this consultation.  If you have questions, please do not hesitate to call me. I look forward to following your patient along with you.       Sincerely,     Argenis Horn PA-C      CC: Honorio Avila MD  ______________________________________________________________________________________    Subjective   Patient ID: Jacob Massey is a 84 y.o. male who was referred by his cardiology PA for Anemia (Last seen 2022 / [facility] - EGD/Colon /Has rectal bleeding from diverticulitis flares per pt/Had an iron infusion about 5 weeks ago ).    Patient's PCP is Dr. Avila    [facility]: colon cancer s/p partial colectomy, atrial fibrillation on Eliquis, HTN, CKD IV with GFR 14    HPI  Patient was referred by his cardiologist for ongoing anemia an drectal bleeding. Patient states that he has \"always been anemic\". In 2022, he was treated for a gastric ulcer with endoscopic resolution (listed below). Patient states that about a month ago he had bleeding. He states that he had a week of red blood in his stool, which has resolved at this point. He states that he also feel that he had some \"internal bleeding\" during this time as well. When asked why, he states that during that week he also felt that he had black somewhat tarry stool as well. His hemoglobin was checked on 7/10 and showed hemoglobin of 9.7 with macrocytic, normochromic indices. His baseline hemoglobin in chart is around 9 dating back to 2 years ago. He states that he was feeling very fatigued 1-2 months ago, but this has improved. He denies SOB, CP, N/V, dysphagia. Patient is on oral iron. He has a familiy history of colon cancer in an aunt and potentially his father.    Prior GI " evaluation:  EGD 7/2022: Barretts esophagus seen  Colonoscopy 4/2022: poor prep, blood and diverticulosis seen in colon  EGD 4/2022: gastric and duodenal ulcers seen  Colonoscopy 6/2021: 1 polyp removed    Review of Systems:  Constitutional: +fatigue  Skin: No reported icterus, lesions, or rash.  Eye: No reported itching, pain, vision changes.  Ear: No reported discharge, hearing loss, or pain.  Nose: No reported congestion, discharge, or epistaxis.  Mouth/throat: No reported dysphagia, hoarseness, or throat pain.  Resp: No reported cough, dyspnea, or wheezing.  Cardiovascular: No reported chest pain, lower extremity edema, or palpitations.   GI: Reports red blood and a black stool 1 month ago  : No reported dysuria, hematuria, or frequency.  Neuro: No reported confusion, memory loss, headaches, or dizziness.  Psych: No reported anxiety, depression, or insomnia.  Musculoskeletal: No reported arthralgia, joint swelling, or myalgias.  Heme/lymph: No reported easy bleeding or bruising, or swollen lymph nodes.  Endocrine: No reported cold/heat intolerance, polydipsia, or polyuria.     Medications:  Prior to Admission medications    Medication Sig Start Date End Date Taking? Authorizing Provider   acetaminophen (TylenoL) 325 mg tablet Take by mouth.   Yes Historical Provider, MD   allopurinol (Zyloprim) 100 mg tablet Take 1 tablet (100 mg) by mouth. 2/25/21  Yes Historical Provider, MD   amlodipine-valsartan (Exforge) 5-160 mg tablet Take 1 tablet by mouth once daily. 1/3/19  Yes Historical Provider, MD   apixaban (Eliquis) 2.5 mg tablet Take 1 tablet (2.5 mg) by mouth 2 times a day. 6/8/22  Yes Historical Provider, MD   ascorbic acid (Vitamin C) 500 mg chewable tablet Chew.   Yes Historical Provider, MD   carvedilol (Coreg) 12.5 mg tablet take 1 tablet by mouth twice a day with food for 90 days 5/10/24  Yes Historical Provider, MD   cholecalciferol (Vitamin D-3) 50 MCG (2000 UT) tablet Take 1 tablet (2,000 Units) by  mouth once daily.   Yes Historical Provider, MD   ferrous sulfate, 325 mg ferrous sulfate, tablet Take 1 tablet by mouth 2 times a day.   Yes Historical Provider, MD   furosemide (Lasix) 20 mg tablet Take 1 tablet (20 mg) by mouth once daily. 10/11/22  Yes Historical Provider, MD   Januvia 25 mg tablet Take 1 tablet (25 mg) by mouth once daily. 5/28/21  Yes Historical Provider, MD   ketoconazole (NIZOral) 2 % shampoo as directed Externally 2-3 x weekly as needed for 30 days   Yes Historical Provider, MD   pantoprazole (ProtoNix) 40 mg EC tablet Take 1 tablet (40 mg) by mouth once daily.   Yes Historical Provider, MD   triamcinolone (Kenalog) 0.1 % cream 1 application to affected area Externally Twice a day as needed for active rash on body, not face, use sporadically contains steroid for 30 days   Yes Historical Provider, MD   VITAMIN A ORAL Take by mouth.   Yes Historical Provider, MD   FreeStyle Tal 2 Sensor kit as directed 5/16/24   Historical Provider, MD   OneTouch Ultra Test strip USE TO TEST BLOOD SUGAR ONCE A DAY 3/5/21   Historical Provider, MD       Allergies:  Tetracycline, Adhesive tape-silicones, Penicillin, Penicillin v potassium, and Tetracycline hcl    Past Medical History:  He has a past medical history of Anemia, Atrial fibrillation (Multi), Chronic kidney disease, CKD (chronic kidney disease), stage IV (Multi), Colon cancer (Multi), Diabetes mellitus (Multi), GERD (gastroesophageal reflux disease), Hypertension, Prostate cancer (Multi), and Skin cancer.    Past Surgical History:  He has a past surgical history that includes Other surgical history (02/21/2022); Other surgical history (02/21/2022); CT angio abdomen pelvis w and or wo IV IV contrast (04/06/2022); Cataract extraction; Tonsillectomy; Cholecystectomy; Colonoscopy; and Upper gastrointestinal endoscopy.    Social History:  He reports that he has quit smoking. His smoking use included cigarettes. He has never used smokeless tobacco. He  "reports that he does not currently use alcohol. He reports that he does not use drugs.    Objective   Physical exam:  Constitutional: Well developed, well nourished 84 y.o. year old in no acute distress.   Skin: Warm and dry. Normal turgor. No rash, ulcer, trauma, jaundice.   Eyes: Pupils symmetric and reactive to light.  Respiratory: Clear to auscultation bilaterally. No wheezes, rhonchi, or rales heard.  Cardiovascular: Regular rate and rhythm. S1 and S2 appreciated and normal. No murmur, rub, or gallop heard.   Edema: No edema noted.  GI: Normal bowel sounds. Soft, non-distended, non-tender. No rebound or guarding present. No hepatomegaly or splenomegaly appreciated. Abdominal aorta not palpably abnormal.  Musculoskeletal: Limbs and Joints grossly normal. Full range of motion in major joint.   Neuro: Alert and oriented x 3. Cranial nerves 2-12 grossly intact.   Psych: Normal mood and affect.        Relevant Results Recent labs reviewed in the EMR.  Lab Results   Component Value Date    HGB 8.9 (L) 05/25/2023    HGB 10.0 (L) 02/17/2023    HGB 9.1 (L) 10/10/2022     (H) 05/25/2023     02/17/2023    MCV 98 10/10/2022     05/25/2023     02/17/2023     10/10/2022       No results found for: \"FERRITIN\", \"IRON\"    Lab Results   Component Value Date     02/17/2023    K 4.4 02/17/2023     (H) 02/17/2023    BUN 53 (H) 02/17/2023    CREATININE 2.50 (H) 02/17/2023       Lab Results   Component Value Date    BILITOT 0.4 04/16/2022     Lab Results   Component Value Date    ALT 11 04/16/2022    ALT 14 04/12/2022    ALT 26 04/07/2022    AST 9 04/16/2022    AST 9 04/12/2022    AST 20 04/07/2022    ALKPHOS 70 04/16/2022    ALKPHOS 66 04/12/2022    ALKPHOS 67 04/07/2022       Lab Results   Component Value Date    CRP 2.53 (H) 07/05/2024    CRP 0.75 10/03/2023    CRP 0.18 02/17/2023    CRP 0.93 10/10/2022    CRP 0.59 08/31/2022       No results found for: \"CALPS\"    Radiology: Reviewed " imaging reviewed in the EMR.  OCT, Optic Nerve - OU - Both Eyes    Result Date: 7/9/2024  Retinal multimodal imaging including photography was completed, and the findings are described in the examination.      Assessment/Plan   Problem List Items Addressed This Visit             ICD-10-CM    Anemia D64.9     Patient's anemia is largely due to CKD IV with GFR 16. His most recent hemoglobin of 9.7 is actually above his baseline. Recommended he continue iron supplementation per hematology/nephrology.         Relevant Medications    Golytely 236-22.74-6.74 -5.86 gram solution    Other Relevant Orders    Esophagogastroduodenoscopy (EGD)    Colonoscopy Diagnostic    Rectal bleeding - Primary K62.5     1 month ago patient had about 1 week of significant red blood. However, he reports what also could be melena with black, tarry stools. Patient is feeling well now. We discussed risks vs benefits of EGD/colonoscopy. Discussed that with his personal history of colon cancer, polyps or return of malignancy less likely. Last colonoscopy in April 2022 limited due to bleeding and poor prep. We discussed that patient would have to hold Eliquis 4 days given GFR with thromboembolic risk. We also discussed increased risk ofCV complication during anesthesia as well as potential colonoscopy complication. Patient verbalizes understanding and would like to have EGD and colonoscopy done.         Relevant Medications    Golytely 236-22.74-6.74 -5.86 gram solution    Other Relevant Orders    Colonoscopy Diagnostic     Meds to hold:   Eliquis 4 days  Jardiance 3 days    Argenis Horn PA-C

## 2024-07-29 NOTE — PATIENT INSTRUCTIONS
Reese you for coming in for your appointment.    -Get EGD and colonoscopy done.  -You will need to hold your Eliquis 4 days before  -Please hold Jardiance 3 days before the procedure    Call with questions or concerns.

## 2024-07-29 NOTE — ASSESSMENT & PLAN NOTE
1 month ago patient had about 1 week of significant red blood. However, he reports what also could be melena with black, tarry stools. Patient is feeling well now. We discussed risks vs benefits of EGD/colonoscopy. Discussed that with his personal history of colon cancer, polyps or return of malignancy less likely. Last colonoscopy in April 2022 limited due to bleeding and poor prep. We discussed that patient would have to hold Eliquis 4 days given GFR with thromboembolic risk. We also discussed increased risk ofCV complication during anesthesia as well as potential colonoscopy complication. Patient verbalizes understanding and would like to have EGD and colonoscopy done.

## 2024-07-29 NOTE — PROGRESS NOTES
"Subjective   Patient ID: Jacob Massey is a 84 y.o. male who was referred by his cardiology PA for Anemia (Last seen 2022 / [facility] - EGD/Colon /Has rectal bleeding from diverticulitis flares per pt/Had an iron infusion about 5 weeks ago ).    Patient's PCP is Dr. Avila    [facility]: colon cancer s/p partial colectomy, atrial fibrillation on Eliquis, HTN, CKD IV with GFR 14    HPI  Patient was referred by his cardiologist for ongoing anemia an drectal bleeding. Patient states that he has \"always been anemic\". In 2022, he was treated for a gastric ulcer with endoscopic resolution (listed below). Patient states that about a month ago he had bleeding. He states that he had a week of red blood in his stool, which has resolved at this point. He states that he also feel that he had some \"internal bleeding\" during this time as well. When asked why, he states that during that week he also felt that he had black somewhat tarry stool as well. His hemoglobin was checked on 7/10 and showed hemoglobin of 9.7 with macrocytic, normochromic indices. His baseline hemoglobin in chart is around 9 dating back to 2 years ago. He states that he was feeling very fatigued 1-2 months ago, but this has improved. He denies SOB, CP, N/V, dysphagia. Patient is on oral iron. He has a familiy history of colon cancer in an aunt and potentially his father.    Prior GI evaluation:  EGD 7/2022: Barretts esophagus seen  Colonoscopy 4/2022: poor prep, blood and diverticulosis seen in colon  EGD 4/2022: gastric and duodenal ulcers seen  Colonoscopy 6/2021: 1 polyp removed    Review of Systems:  Constitutional: +fatigue  Skin: No reported icterus, lesions, or rash.  Eye: No reported itching, pain, vision changes.  Ear: No reported discharge, hearing loss, or pain.  Nose: No reported congestion, discharge, or epistaxis.  Mouth/throat: No reported dysphagia, hoarseness, or throat pain.  Resp: No reported cough, dyspnea, or wheezing.  Cardiovascular: No " reported chest pain, lower extremity edema, or palpitations.   GI: Reports red blood and a black stool 1 month ago  : No reported dysuria, hematuria, or frequency.  Neuro: No reported confusion, memory loss, headaches, or dizziness.  Psych: No reported anxiety, depression, or insomnia.  Musculoskeletal: No reported arthralgia, joint swelling, or myalgias.  Heme/lymph: No reported easy bleeding or bruising, or swollen lymph nodes.  Endocrine: No reported cold/heat intolerance, polydipsia, or polyuria.     Medications:  Prior to Admission medications    Medication Sig Start Date End Date Taking? Authorizing Provider   acetaminophen (TylenoL) 325 mg tablet Take by mouth.   Yes Historical Provider, MD   allopurinol (Zyloprim) 100 mg tablet Take 1 tablet (100 mg) by mouth. 2/25/21  Yes Historical Provider, MD   amlodipine-valsartan (Exforge) 5-160 mg tablet Take 1 tablet by mouth once daily. 1/3/19  Yes Historical Provider, MD   apixaban (Eliquis) 2.5 mg tablet Take 1 tablet (2.5 mg) by mouth 2 times a day. 6/8/22  Yes Historical Provider, MD   ascorbic acid (Vitamin C) 500 mg chewable tablet Chew.   Yes Historical Provider, MD   carvedilol (Coreg) 12.5 mg tablet take 1 tablet by mouth twice a day with food for 90 days 5/10/24  Yes Historical Provider, MD   cholecalciferol (Vitamin D-3) 50 MCG (2000 UT) tablet Take 1 tablet (2,000 Units) by mouth once daily.   Yes Historical Provider, MD   ferrous sulfate, 325 mg ferrous sulfate, tablet Take 1 tablet by mouth 2 times a day.   Yes Historical Provider, MD   furosemide (Lasix) 20 mg tablet Take 1 tablet (20 mg) by mouth once daily. 10/11/22  Yes Historical Provider, MD   Januvia 25 mg tablet Take 1 tablet (25 mg) by mouth once daily. 5/28/21  Yes Historical Provider, MD   ketoconazole (NIZOral) 2 % shampoo as directed Externally 2-3 x weekly as needed for 30 days   Yes Historical Provider, MD   pantoprazole (ProtoNix) 40 mg EC tablet Take 1 tablet (40 mg) by mouth once  daily.   Yes Historical Provider, MD   triamcinolone (Kenalog) 0.1 % cream 1 application to affected area Externally Twice a day as needed for active rash on body, not face, use sporadically contains steroid for 30 days   Yes Historical Provider, MD   VITAMIN A ORAL Take by mouth.   Yes Historical Provider, MD   FreeStyle Tal 2 Sensor kit as directed 5/16/24   Historical Provider, MD   OneTouch Ultra Test strip USE TO TEST BLOOD SUGAR ONCE A DAY 3/5/21   Historical Provider, MD       Allergies:  Tetracycline, Adhesive tape-silicones, Penicillin, Penicillin v potassium, and Tetracycline hcl    Past Medical History:  He has a past medical history of Anemia, Atrial fibrillation (Multi), Chronic kidney disease, CKD (chronic kidney disease), stage IV (Multi), Colon cancer (Multi), Diabetes mellitus (Multi), GERD (gastroesophageal reflux disease), Hypertension, Prostate cancer (Multi), and Skin cancer.    Past Surgical History:  He has a past surgical history that includes Other surgical history (02/21/2022); Other surgical history (02/21/2022); CT angio abdomen pelvis w and or wo IV IV contrast (04/06/2022); Cataract extraction; Tonsillectomy; Cholecystectomy; Colonoscopy; and Upper gastrointestinal endoscopy.    Social History:  He reports that he has quit smoking. His smoking use included cigarettes. He has never used smokeless tobacco. He reports that he does not currently use alcohol. He reports that he does not use drugs.    Objective   Physical exam:  Constitutional: Well developed, well nourished 84 y.o. year old in no acute distress.   Skin: Warm and dry. Normal turgor. No rash, ulcer, trauma, jaundice.   Eyes: Pupils symmetric and reactive to light.  Respiratory: Clear to auscultation bilaterally. No wheezes, rhonchi, or rales heard.  Cardiovascular: Regular rate and rhythm. S1 and S2 appreciated and normal. No murmur, rub, or gallop heard.   Edema: No edema noted.  GI: Normal bowel sounds. Soft,  "non-distended, non-tender. No rebound or guarding present. No hepatomegaly or splenomegaly appreciated. Abdominal aorta not palpably abnormal.  Musculoskeletal: Limbs and Joints grossly normal. Full range of motion in major joint.   Neuro: Alert and oriented x 3. Cranial nerves 2-12 grossly intact.   Psych: Normal mood and affect.        Relevant Results Recent labs reviewed in the EMR.  Lab Results   Component Value Date    HGB 8.9 (L) 05/25/2023    HGB 10.0 (L) 02/17/2023    HGB 9.1 (L) 10/10/2022     (H) 05/25/2023     02/17/2023    MCV 98 10/10/2022     05/25/2023     02/17/2023     10/10/2022       No results found for: \"FERRITIN\", \"IRON\"    Lab Results   Component Value Date     02/17/2023    K 4.4 02/17/2023     (H) 02/17/2023    BUN 53 (H) 02/17/2023    CREATININE 2.50 (H) 02/17/2023       Lab Results   Component Value Date    BILITOT 0.4 04/16/2022     Lab Results   Component Value Date    ALT 11 04/16/2022    ALT 14 04/12/2022    ALT 26 04/07/2022    AST 9 04/16/2022    AST 9 04/12/2022    AST 20 04/07/2022    ALKPHOS 70 04/16/2022    ALKPHOS 66 04/12/2022    ALKPHOS 67 04/07/2022       Lab Results   Component Value Date    CRP 2.53 (H) 07/05/2024    CRP 0.75 10/03/2023    CRP 0.18 02/17/2023    CRP 0.93 10/10/2022    CRP 0.59 08/31/2022       No results found for: \"CALPS\"    Radiology: Reviewed imaging reviewed in the EMR.  OCT, Optic Nerve - OU - Both Eyes    Result Date: 7/9/2024  Retinal multimodal imaging including photography was completed, and the findings are described in the examination.      Assessment/Plan   Problem List Items Addressed This Visit             ICD-10-CM    Anemia D64.9     Patient's anemia is largely due to CKD IV with GFR 16. His most recent hemoglobin of 9.7 is actually above his baseline. Recommended he continue iron supplementation per hematology/nephrology.         Relevant Medications    Golytely 236-22.74-6.74 -5.86 gram " solution    Other Relevant Orders    Esophagogastroduodenoscopy (EGD)    Colonoscopy Diagnostic    Rectal bleeding - Primary K62.5     1 month ago patient had about 1 week of significant red blood. However, he reports what also could be melena with black, tarry stools. Patient is feeling well now. We discussed risks vs benefits of EGD/colonoscopy. Discussed that with his personal history of colon cancer, polyps or return of malignancy less likely. Last colonoscopy in April 2022 limited due to bleeding and poor prep. We discussed that patient would have to hold Eliquis 4 days given GFR with thromboembolic risk. We also discussed increased risk ofCV complication during anesthesia as well as potential colonoscopy complication. Patient verbalizes understanding and would like to have EGD and colonoscopy done.         Relevant Medications    Golytely 236-22.74-6.74 -5.86 gram solution    Other Relevant Orders    Colonoscopy Diagnostic     Meds to hold:   Eliquis 4 days  Jardiance 3 days    Argenis Horn PA-C

## 2024-08-01 ENCOUNTER — TELEPHONE (OUTPATIENT)
Dept: PREADMISSION TESTING | Facility: HOSPITAL | Age: 84
End: 2024-08-01
Payer: MEDICARE

## 2024-08-05 ENCOUNTER — TELEPHONE (OUTPATIENT)
Dept: PREADMISSION TESTING | Facility: HOSPITAL | Age: 84
End: 2024-08-05
Payer: MEDICARE

## 2024-08-25 ENCOUNTER — ANESTHESIA EVENT (OUTPATIENT)
Dept: OPERATING ROOM | Facility: HOSPITAL | Age: 84
End: 2024-08-25
Payer: MEDICARE

## 2024-08-26 ENCOUNTER — TELEPHONE (OUTPATIENT)
Dept: GASTROENTEROLOGY | Facility: CLINIC | Age: 84
End: 2024-08-26
Payer: MEDICARE

## 2024-08-26 NOTE — TELEPHONE ENCOUNTER
Patient is scheduled for procedure in OR on 8/29/24.  He said he was instructed to hold Jardiance 3 days prior but he is not on Jardiance, he is on Januvia.  Should he hold Januvia 3 days prior?

## 2024-08-29 ENCOUNTER — ANESTHESIA (OUTPATIENT)
Dept: OPERATING ROOM | Facility: HOSPITAL | Age: 84
End: 2024-08-29
Payer: MEDICARE

## 2024-08-29 ENCOUNTER — HOSPITAL ENCOUNTER (OUTPATIENT)
Dept: CARDIOLOGY | Facility: HOSPITAL | Age: 84
Discharge: HOME | End: 2024-08-29
Payer: MEDICARE

## 2024-08-29 ENCOUNTER — HOSPITAL ENCOUNTER (OUTPATIENT)
Dept: OPERATING ROOM | Facility: HOSPITAL | Age: 84
Discharge: HOME | End: 2024-08-29
Payer: MEDICARE

## 2024-08-29 VITALS
SYSTOLIC BLOOD PRESSURE: 164 MMHG | DIASTOLIC BLOOD PRESSURE: 63 MMHG | RESPIRATION RATE: 16 BRPM | HEART RATE: 74 BPM | TEMPERATURE: 97.5 F | OXYGEN SATURATION: 98 % | HEIGHT: 70 IN | BODY MASS INDEX: 26.98 KG/M2

## 2024-08-29 DIAGNOSIS — D64.9 ANEMIA, UNSPECIFIED TYPE: ICD-10-CM

## 2024-08-29 DIAGNOSIS — K62.5 RECTAL BLEEDING: ICD-10-CM

## 2024-08-29 LAB
ANION GAP SERPL CALC-SCNC: 14 MMOL/L (ref 10–20)
ATRIAL RATE: 68 BPM
BUN SERPL-MCNC: 48 MG/DL (ref 6–23)
CALCIUM SERPL-MCNC: 9.3 MG/DL (ref 8.6–10.3)
CHLORIDE SERPL-SCNC: 111 MMOL/L (ref 98–107)
CO2 SERPL-SCNC: 20 MMOL/L (ref 21–32)
CREAT SERPL-MCNC: 2.74 MG/DL (ref 0.5–1.3)
EGFRCR SERPLBLD CKD-EPI 2021: 22 ML/MIN/1.73M*2
ERYTHROCYTE [DISTWIDTH] IN BLOOD BY AUTOMATED COUNT: 15.6 % (ref 11.5–14.5)
GLUCOSE BLD MANUAL STRIP-MCNC: 110 MG/DL (ref 74–99)
GLUCOSE SERPL-MCNC: 111 MG/DL (ref 74–99)
HCT VFR BLD AUTO: 30.7 % (ref 41–52)
HGB BLD-MCNC: 10.3 G/DL (ref 13.5–17.5)
MCH RBC QN AUTO: 34.6 PG (ref 26–34)
MCHC RBC AUTO-ENTMCNC: 33.6 G/DL (ref 32–36)
MCV RBC AUTO: 103 FL (ref 80–100)
NRBC BLD-RTO: 0 /100 WBCS (ref 0–0)
P AXIS: 71 DEGREES
PLATELET # BLD AUTO: 147 X10*3/UL (ref 150–450)
POTASSIUM SERPL-SCNC: 4.3 MMOL/L (ref 3.5–5.3)
PR INTERVAL: 189 MS
Q ONSET: 252 MS
QRS COUNT: 11 BEATS
QRS DURATION: 153 MS
QT INTERVAL: 475 MS
QTC CALCULATION(BAZETT): 506 MS
QTC FREDERICIA: 495 MS
R AXIS: -40 DEGREES
RBC # BLD AUTO: 2.98 X10*6/UL (ref 4.5–5.9)
SODIUM SERPL-SCNC: 141 MMOL/L (ref 136–145)
T AXIS: 14 DEGREES
T OFFSET: 489 MS
VENTRICULAR RATE: 68 BPM
WBC # BLD AUTO: 4.5 X10*3/UL (ref 4.4–11.3)

## 2024-08-29 PROCEDURE — 93005 ELECTROCARDIOGRAM TRACING: CPT

## 2024-08-29 PROCEDURE — 2500000001 HC RX 250 WO HCPCS SELF ADMINISTERED DRUGS (ALT 637 FOR MEDICARE OP): Performed by: ANESTHESIOLOGY

## 2024-08-29 PROCEDURE — 45378 DIAGNOSTIC COLONOSCOPY: CPT | Performed by: INTERNAL MEDICINE

## 2024-08-29 PROCEDURE — 2500000005 HC RX 250 GENERAL PHARMACY W/O HCPCS: Performed by: NURSE ANESTHETIST, CERTIFIED REGISTERED

## 2024-08-29 PROCEDURE — 7100000009 HC PHASE TWO TIME - INITIAL BASE CHARGE: Performed by: NURSE ANESTHETIST, CERTIFIED REGISTERED

## 2024-08-29 PROCEDURE — 3700000001 HC GENERAL ANESTHESIA TIME - INITIAL BASE CHARGE: Performed by: NURSE ANESTHETIST, CERTIFIED REGISTERED

## 2024-08-29 PROCEDURE — 82947 ASSAY GLUCOSE BLOOD QUANT: CPT

## 2024-08-29 PROCEDURE — 3600000007 HC OR TIME - EACH INCREMENTAL 1 MINUTE - PROCEDURE LEVEL TWO: Performed by: NURSE ANESTHETIST, CERTIFIED REGISTERED

## 2024-08-29 PROCEDURE — 3700000002 HC GENERAL ANESTHESIA TIME - EACH INCREMENTAL 1 MINUTE: Performed by: NURSE ANESTHETIST, CERTIFIED REGISTERED

## 2024-08-29 PROCEDURE — 3600000002 HC OR TIME - INITIAL BASE CHARGE - PROCEDURE LEVEL TWO: Performed by: NURSE ANESTHETIST, CERTIFIED REGISTERED

## 2024-08-29 PROCEDURE — 43235 EGD DIAGNOSTIC BRUSH WASH: CPT | Performed by: INTERNAL MEDICINE

## 2024-08-29 PROCEDURE — 2500000004 HC RX 250 GENERAL PHARMACY W/ HCPCS (ALT 636 FOR OP/ED): Performed by: NURSE ANESTHETIST, CERTIFIED REGISTERED

## 2024-08-29 PROCEDURE — 85027 COMPLETE CBC AUTOMATED: CPT | Performed by: ANESTHESIOLOGY

## 2024-08-29 PROCEDURE — 7100000010 HC PHASE TWO TIME - EACH INCREMENTAL 1 MINUTE: Performed by: NURSE ANESTHETIST, CERTIFIED REGISTERED

## 2024-08-29 PROCEDURE — 80048 BASIC METABOLIC PNL TOTAL CA: CPT | Performed by: ANESTHESIOLOGY

## 2024-08-29 PROCEDURE — 2500000004 HC RX 250 GENERAL PHARMACY W/ HCPCS (ALT 636 FOR OP/ED): Performed by: ANESTHESIOLOGY

## 2024-08-29 PROCEDURE — 36415 COLL VENOUS BLD VENIPUNCTURE: CPT | Performed by: ANESTHESIOLOGY

## 2024-08-29 RX ORDER — FAMOTIDINE 10 MG/ML
20 INJECTION INTRAVENOUS ONCE
Status: COMPLETED | OUTPATIENT
Start: 2024-08-29 | End: 2024-08-29

## 2024-08-29 RX ORDER — LIDOCAINE HCL/PF 100 MG/5ML
SYRINGE (ML) INTRAVENOUS AS NEEDED
Status: DISCONTINUED | OUTPATIENT
Start: 2024-08-29 | End: 2024-08-29

## 2024-08-29 RX ORDER — ALBUTEROL SULFATE 0.83 MG/ML
2.5 SOLUTION RESPIRATORY (INHALATION) ONCE
Status: DISCONTINUED | OUTPATIENT
Start: 2024-08-29 | End: 2024-08-29 | Stop reason: HOSPADM

## 2024-08-29 RX ORDER — METOCLOPRAMIDE HYDROCHLORIDE 5 MG/ML
5 INJECTION INTRAMUSCULAR; INTRAVENOUS ONCE
Status: COMPLETED | OUTPATIENT
Start: 2024-08-29 | End: 2024-08-29

## 2024-08-29 RX ORDER — PROPOFOL 10 MG/ML
INJECTION, EMULSION INTRAVENOUS AS NEEDED
Status: DISCONTINUED | OUTPATIENT
Start: 2024-08-29 | End: 2024-08-29

## 2024-08-29 RX ORDER — SODIUM CHLORIDE 9 MG/ML
20 INJECTION, SOLUTION INTRAVENOUS CONTINUOUS
Status: DISCONTINUED | OUTPATIENT
Start: 2024-08-29 | End: 2024-08-30 | Stop reason: HOSPADM

## 2024-08-29 RX ORDER — FENTANYL CITRATE 50 UG/ML
INJECTION, SOLUTION INTRAMUSCULAR; INTRAVENOUS AS NEEDED
Status: DISCONTINUED | OUTPATIENT
Start: 2024-08-29 | End: 2024-08-29

## 2024-08-29 SDOH — HEALTH STABILITY: MENTAL HEALTH: CURRENT SMOKER: 0

## 2024-08-29 ASSESSMENT — PAIN SCALES - GENERAL
PAIN_LEVEL: 0
PAINLEVEL_OUTOF10: 0 - NO PAIN

## 2024-08-29 ASSESSMENT — PAIN - FUNCTIONAL ASSESSMENT
PAIN_FUNCTIONAL_ASSESSMENT: 0-10

## 2024-08-29 ASSESSMENT — COLUMBIA-SUICIDE SEVERITY RATING SCALE - C-SSRS
2. HAVE YOU ACTUALLY HAD ANY THOUGHTS OF KILLING YOURSELF?: NO
1. IN THE PAST MONTH, HAVE YOU WISHED YOU WERE DEAD OR WISHED YOU COULD GO TO SLEEP AND NOT WAKE UP?: NO

## 2024-08-29 NOTE — ANESTHESIA POSTPROCEDURE EVALUATION
Patient: Jacob Massey    Procedure Summary       Date: 08/29/24 Room / Location: [facility] OR    Anesthesia Start: 0952 Anesthesia Stop: 1025    Procedures:       EGD      COLONOSCOPY Diagnosis:       Anemia, unspecified type      Rectal bleeding    Scheduled Providers: Gurinder Andrade MD Responsible Provider: MASSIEL Hernandez    Anesthesia Type: MAC ASA Status: 3            Anesthesia Type: MAC    Vitals Value Taken Time   /63 08/29/24 1040   Temp 36.4 °C (97.5 °F) 08/29/24 1040   Pulse 74 08/29/24 1040   Resp 16 08/29/24 1040   SpO2 98 % 08/29/24 1040       Anesthesia Post Evaluation    Patient location during evaluation: PACU  Patient participation: complete - patient participated  Level of consciousness: awake and alert  Pain score: 0  Pain management: adequate  Airway patency: patent  Cardiovascular status: hemodynamically stable  Respiratory status: room air  Hydration status: acceptable  Postoperative Nausea and Vomiting: none    There were no known notable events for this encounter.

## 2024-08-29 NOTE — H&P
Pre-sedation Evaluation:  Sedation Necessary For: Analgesia  Sedation to be Managed By: Anesthesia (Monitored Anesthesia Care/MAC)    History of Present Illness and Indication for Procedure      Jacob Massey is a 84 y.o. male with a history of A-fib, CKD, HTN, prostate cancer, and DM who presents for EGD/colonoscopy to evaluate anemia.      He has long standing anemia and has had multiple EGDs and colonoscopies since 2020 (detailed below). He does have a history of colon cancer and is s/p partial colectomy. There is a family history of colon cancer in his aunt and father.    he has taken Eliquis (Apixaban) with the last dose 5 days prior to the procedure.      NPO guidelines met: Yes    Endoscopy History  2021 - Colonoscopy: small transverse polyp (hyperplastic on path), diverticulosis  4/2022 - EGD: gastric ulcers and duodenal ulcers  4/2022 - Colonoscopy: diverticulosis  6/2022 - EGD: LA grade B esophagitis (changes of GERD with questionable BE on path), normal stomach (negative for H pylori on path), duodenitis       Review of Systems  Constitutional:  Negative for chills, fever and unexpected weight change.   HENT:  Negative for trouble swallowing.    Respiratory:  Negative for shortness of breath.    Cardiovascular:  Negative for chest pain.   Gastrointestinal:  As above.   Skin:  Negative for color change.       I performed a complete 10 point review of systems and it is negative except as noted in HPI or above. All other systems have been reviewed and are negative.      Patient Active Problem List   Diagnosis    A-fib (Multi)    Barretts esophagus    ESR raised    Gastric ulcer    GCA (giant cell arteritis) (Multi)    Optic atrophy of right eye    HTN (hypertension)    Shooting pain    Anemia    Benign essential hypertension    Malignant neoplasm of prostate (Multi)    CKD (chronic kidney disease), stage IV (Multi)    Anemia in stage 4 chronic kidney disease (Multi)    Rectal bleeding    Diabetes  mellitus (Multi)       Past Medical History:  He has a past medical history of Anemia, Atrial fibrillation (Multi), Chronic kidney disease, CKD (chronic kidney disease), stage IV (Multi), Colon cancer (Multi), Diabetes mellitus (Multi), GERD (gastroesophageal reflux disease), Hypertension, Prostate cancer (Multi), and Skin cancer.    Past Surgical History:  He has a past surgical history that includes Other surgical history (02/21/2022); Other surgical history (02/21/2022); CT angio abdomen pelvis w and or wo IV IV contrast (04/06/2022); Cataract extraction; Tonsillectomy; Cholecystectomy; Colonoscopy; and Upper gastrointestinal endoscopy.      Social History:  He reports that he has quit smoking. His smoking use included cigarettes. He has never used smokeless tobacco. He reports that he does not currently use alcohol. He reports that he does not use drugs.    Family History:  Family History   Problem Relation Name Age of Onset    Colon cancer Father          Allergies:  Tetracycline, Adhesive tape-silicones, Penicillin, Penicillin v potassium, and Tetracycline hcl    Current Medications  Current Outpatient Medications on File Prior to Encounter   Medication Sig Dispense Refill    acetaminophen (TylenoL) 325 mg tablet Take by mouth.      allopurinol (Zyloprim) 100 mg tablet Take 1 tablet (100 mg) by mouth.      apixaban (Eliquis) 2.5 mg tablet Take 1 tablet (2.5 mg) by mouth 2 times a day.      ascorbic acid (Vitamin C) 500 mg chewable tablet Chew.      carvedilol (Coreg) 12.5 mg tablet take 1 tablet by mouth twice a day with food for 90 days      cholecalciferol (Vitamin D-3) 50 MCG (2000 UT) tablet Take 1 tablet (2,000 Units) by mouth once daily.      ferrous sulfate, 325 mg ferrous sulfate, tablet Take 1 tablet (325 mg) by mouth 2 times a day.      furosemide (Lasix) 20 mg tablet Take 1 tablet (20 mg) by mouth once daily.      Januvia 25 mg tablet Take 1 tablet (25 mg) by mouth once daily.      OneTouch Ultra  "Test strip USE TO TEST BLOOD SUGAR ONCE A DAY      pantoprazole (ProtoNix) 40 mg EC tablet Take 1 tablet (40 mg) by mouth once daily.      VITAMIN A ORAL Take by mouth.      amlodipine-valsartan (Exforge) 5-160 mg tablet Take 1 tablet by mouth once daily.      FreeStyle Tal 2 Sensor kit as directed      ketoconazole (NIZOral) 2 % shampoo as directed Externally 2-3 x weekly as needed for 30 days      triamcinolone (Kenalog) 0.1 % cream 1 application to affected area Externally Twice a day as needed for active rash on body, not face, use sporadically contains steroid for 30 days       No current facility-administered medications on file prior to encounter.         Last Recorded Vitals  Pulse 68, temperature 36.3 °C (97.4 °F), temperature source Temporal, resp. rate 11, height 1.778 m (5' 10\"), SpO2 100%.      Physical Exam  Vitals reviewed.   Constitutional:       General: He is not in acute distress.     Appearance: He is not ill-appearing.   HENT:      Head: Normocephalic and atraumatic.      Mouth/Throat:      Comments: Mallampati: III  Cardiovascular:      Rate and Rhythm: Normal rate and regular rhythm.      Pulses: Normal pulses.      Heart sounds: Normal heart sounds. No murmur heard.  Pulmonary:      Effort: Pulmonary effort is normal. No respiratory distress.   Abdominal:      General: Bowel sounds are normal.      Palpations: Abdomen is soft.      Tenderness: There is no abdominal tenderness.   Skin:     General: Skin is warm and dry.   Neurological:      General: No focal deficit present.      Mental Status: He is alert and oriented to person, place, and time.              Assessment/Plan       EGD/colonoscopy in OR with MAC sedation, ASA 4      Level of Sedation: Moderate Sedation  (Sedation medications to be delivered via monitored anesthesia care (MAC).     This evaluation serves as my H&P.     Outpatient medication list and allergies have been reviewed.  Pre-procedure/olesya procedure antibiotics not " needed.     Pre-procedure evaluation completed by physician.           Gurinder Andrade MD

## 2024-08-29 NOTE — ANESTHESIA PREPROCEDURE EVALUATION
Patient: Jacob Massey    Procedure Information       Date/Time: 08/29/24 1030    Scheduled providers: Gurinder Andrade MD    Procedures:       EGD      COLONOSCOPY    Location: [facility] OR            Relevant Problems   Anesthesia (within normal limits)      Cardiac   (+) A-fib (Multi)   (+) Benign essential hypertension   (+) HTN (hypertension)      GI   (+) Gastric ulcer   (+) Rectal bleeding      /Renal   (+) Malignant neoplasm of prostate (Multi)      Hematology   (+) Anemia   (+) Anemia in stage 4 chronic kidney disease (Multi)       Clinical information reviewed:   Tobacco  Allergies  Meds   Med Hx  Surg Hx   Fam Hx  Soc Hx        NPO Detail:  NPO/Void Status  Date of Last Liquid: 08/29/24  Time of Last Liquid: 0445  Date of Last Solid: 08/27/24  Time of Last Solid: 2000         Physical Exam    Airway  Mallampati: III  TM distance: >3 FB  Neck ROM: full     Cardiovascular - normal exam     Dental - normal exam     Pulmonary - normal exam     Abdominal - normal exam             Anesthesia Plan    History of general anesthesia?: yes  History of complications of general anesthesia?: no    ASA 3     MAC     The patient is not a current smoker.    intravenous induction   Anesthetic plan and risks discussed with patient.  Use of blood products discussed with patient who consented to blood products.    Plan discussed with CRNA.

## 2024-08-30 ASSESSMENT — PAIN SCALES - GENERAL: PAINLEVEL_OUTOF10: 0 - NO PAIN

## 2024-08-30 NOTE — ADDENDUM NOTE
Encounter addended by: Renae Senior RN on: 8/30/2024 12:18 PM   Actions taken: Flowsheet macro applied, Flowsheet accepted

## 2024-08-30 NOTE — ADDENDUM NOTE
Encounter addended by: Renae Senior RN on: 8/30/2024 12:14 PM   Actions taken: Contacts section saved

## 2024-11-06 PROBLEM — K92.2 GASTROINTESTINAL HEMORRHAGE: Status: ACTIVE | Noted: 2022-04-06

## 2024-11-06 PROBLEM — Z20.822 CONTACT WITH AND (SUSPECTED) EXPOSURE TO COVID-19: Status: ACTIVE | Noted: 2022-04-19

## 2024-11-06 PROBLEM — D62 ACUTE POSTHEMORRHAGIC ANEMIA: Status: ACTIVE | Noted: 2022-04-19

## 2024-11-06 PROBLEM — K21.00 GASTRO-ESOPHAGEAL REFLUX DISEASE WITH ESOPHAGITIS, WITHOUT BLEEDING: Status: ACTIVE | Noted: 2022-06-03

## 2024-11-06 PROBLEM — K31.A0 GASTRIC INTESTINAL METAPLASIA, UNSPECIFIED: Status: ACTIVE | Noted: 2022-06-03

## 2024-11-12 ENCOUNTER — APPOINTMENT (OUTPATIENT)
Dept: CARDIOLOGY | Facility: CLINIC | Age: 84
End: 2024-11-12
Payer: MEDICARE

## 2024-11-12 VITALS
BODY MASS INDEX: 27.2 KG/M2 | HEIGHT: 70 IN | HEART RATE: 65 BPM | SYSTOLIC BLOOD PRESSURE: 136 MMHG | DIASTOLIC BLOOD PRESSURE: 74 MMHG | WEIGHT: 190 LBS

## 2024-11-12 DIAGNOSIS — I48.0 PAROXYSMAL ATRIAL FIBRILLATION (MULTI): Primary | ICD-10-CM

## 2024-11-12 PROCEDURE — 99215 OFFICE O/P EST HI 40 MIN: CPT | Mod: 25 | Performed by: INTERNAL MEDICINE

## 2024-11-12 PROCEDURE — 3078F DIAST BP <80 MM HG: CPT | Performed by: INTERNAL MEDICINE

## 2024-11-12 PROCEDURE — 99215 OFFICE O/P EST HI 40 MIN: CPT | Performed by: INTERNAL MEDICINE

## 2024-11-12 PROCEDURE — 93010 ELECTROCARDIOGRAM REPORT: CPT | Performed by: INTERNAL MEDICINE

## 2024-11-12 PROCEDURE — 1159F MED LIST DOCD IN RCRD: CPT | Performed by: INTERNAL MEDICINE

## 2024-11-12 PROCEDURE — 93005 ELECTROCARDIOGRAM TRACING: CPT | Performed by: INTERNAL MEDICINE

## 2024-11-12 PROCEDURE — 3075F SYST BP GE 130 - 139MM HG: CPT | Performed by: INTERNAL MEDICINE

## 2024-11-12 RX ORDER — AMLODIPINE BESYLATE 5 MG/1
1 TABLET ORAL
COMMUNITY
Start: 2024-08-05

## 2024-11-12 NOTE — PATIENT INSTRUCTIONS
Thanks for following up in office today.    1)  I think that you should continue to use the lasix only as needed.     2)  I want you to have your medication list as well as your last cholesterol blood work faxed to our office [phone].    3)  Please continue your cardiac medications as prescribed.  We talked about the use of eliquis with afib.  I think that if Dr Avila is concerned about the use of eliquis and your blood counts then he needs to let us know.  Right now it looks like your blood counts are stabilized.  We will reach out to his office so that Dr Aguirre can talk to him.    Follow up with JORGE METZGER in 6 months  If you have any questions, please call [phone] and choose option for Dr. Aguirre's nurse Heidi Jordan

## 2024-11-12 NOTE — PROGRESS NOTES
"Chief Complaint:   No chief complaint on file.     History Of Present Illness:    Jacob Massey is a 84 y.o. male presenting for yearly follow up.  H/o pAfib, HTN, CKD, colon CA with partial colectomy, GIB in 2022 (Hb 6.7, required 4u pRBC) who presents for follow up.     Denies chest pain, but admits to some random heartburn, worse after he eats.   Denies any significant palps, irregular heart rate, dizziness.  No blood in stools or urine, no recent falls or head trauma.    Has rare swelling of the ankles which goes down with leg elevation.    Tells me he follows with a nephrologist, and they are aware that he is on amlodipine-valsartan.    He is telling that one of his pills was stopped due to lower blood pressure and anemia - however is not sure exactly which one.  Recent EGD unremarkable, and recent colonoscopy in 8/24 demonstrated diverticuli and internal/external hemorrhoids.  Denies any blood in the stool.  Has gotten an iron infusion, and has been having H/H followed by PCP.  He has not had to take lasix for swelling for at least one year.    ROS:  The remainder of the review of systems was obtained, as was negative as pertains to the chief complaint.    CV testing and labs:  8/2024  K 4.3  BUN 48  crea 2.74  H&H 10.3  30.7  2/2023  BMP  k 4.4 BUN 53, crea 2.50    CBC    H&H 8.9/28.3  4/2022 TTE   EF 60-65% stage I DD, left atrium moderately dilated,     Last Recorded Vitals:  Vitals:    11/12/24 1335   BP: 136/74   Pulse: 65   Weight: 86.2 kg (190 lb)   Height: 1.778 m (5' 10\")       Past Medical History:  He has a past medical history of Anemia, Atrial fibrillation (Multi), Chronic kidney disease, CKD (chronic kidney disease), stage IV (Multi), Colon cancer (Multi), Diabetes mellitus (Multi), GERD (gastroesophageal reflux disease), Hypertension, Prostate cancer (Multi), and Skin cancer.    Past Surgical History:  He has a past surgical history that includes Other surgical history (02/21/2022); Other " surgical history (02/21/2022); CT angio abdomen pelvis w and or wo IV IV contrast (04/06/2022); Cataract extraction; Tonsillectomy; Cholecystectomy; Colonoscopy; and Upper gastrointestinal endoscopy.      Social History:  He reports that he has quit smoking. His smoking use included cigarettes. He has never used smokeless tobacco. He reports that he does not currently use alcohol. He reports that he does not use drugs.    Family History:  Family History   Problem Relation Name Age of Onset    Colon cancer Father          Allergies:  Tetracycline, Adhesive tape-silicones, Penicillin, Penicillin v potassium, and Tetracycline hcl    Outpatient Medications:  Current Outpatient Medications   Medication Instructions    acetaminophen (TylenoL) 325 mg tablet Take by mouth.    allopurinol (ZYLOPRIM) 100 mg    amLODIPine (Norvasc) 5 mg tablet 1 tablet, Daily (0630)    amlodipine-valsartan (Exforge) 5-160 mg tablet 1 tablet, oral, Daily    apixaban (Eliquis) 2.5 mg tablet 1 tablet, 2 times daily    ascorbic acid (Vitamin C) 500 mg chewable tablet Chew.    carvedilol (Coreg) 12.5 mg tablet take 1 tablet by mouth twice a day with food for 90 days    cholecalciferol (VITAMIN D-3) 2,000 Units, Daily    ferrous sulfate (325 mg ferrous sulfate) 325 mg, 2 times daily    FreeStyle Tal 2 Sensor kit as directed    furosemide (LASIX) 20 mg, Daily    Januvia 25 mg tablet 1 tablet, Daily    ketoconazole (NIZOral) 2 % shampoo as directed Externally 2-3 x weekly as needed for 30 days    OneTouch Ultra Test strip USE TO TEST BLOOD SUGAR ONCE A DAY    pantoprazole (ProtoNix) 40 mg EC tablet 1 tablet, Daily    triamcinolone (Kenalog) 0.1 % cream 1 application to affected area Externally Twice a day as needed for active rash on body, not face, use sporadically contains steroid for 30 days    VITAMIN A ORAL Take by mouth.       Physical Exam:  Physical Exam  HENT:      Head: Normocephalic.      Nose: Nose normal.      Mouth/Throat:      Mouth:  "Mucous membranes are moist.   Cardiovascular:      Rate and Rhythm: Normal rate and regular rhythm.   Pulmonary:      Effort: Pulmonary effort is normal.      Breath sounds: Normal breath sounds.   Abdominal:      Palpations: Abdomen is soft.   Musculoskeletal:         General: Normal range of motion.      Cervical back: Normal range of motion.   Skin:     General: Skin is warm and dry.   Neurological:      General: No focal deficit present.      Mental Status: He is alert.   Psychiatric:         Mood and Affect: Mood normal.            Last Labs:  CBC -  Lab Results   Component Value Date    WBC 4.5 08/29/2024    HGB 10.3 (L) 08/29/2024    HCT 30.7 (L) 08/29/2024     (H) 08/29/2024     (L) 08/29/2024       CMP -  Lab Results   Component Value Date    CALCIUM 9.3 08/29/2024    PHOS 3.8 02/17/2023    PROT 4.6 (L) 04/16/2022    ALBUMIN 4.0 02/17/2023    AST 9 04/16/2022    ALT 11 04/16/2022    ALKPHOS 70 04/16/2022    BILITOT 0.4 04/16/2022       LIPID PANEL -   No results found for: \"CHOL\", \"TRIG\", \"HDL\", \"CHHDL\", \"LDLF\", \"VLDL\", \"NHDL\"    RENAL FUNCTION PANEL -   Lab Results   Component Value Date    GLUCOSE 111 (H) 08/29/2024     08/29/2024    K 4.3 08/29/2024     (H) 08/29/2024    CO2 20 (L) 08/29/2024    ANIONGAP 14 08/29/2024    BUN 48 (H) 08/29/2024    CREATININE 2.74 (H) 08/29/2024    GFRMALE 25 (A) 02/17/2023    CALCIUM 9.3 08/29/2024    PHOS 3.8 02/17/2023    ALBUMIN 4.0 02/17/2023        Lab Results   Component Value Date    HGBA1C 5.9 (A) 02/17/2023         Assessment/Plan   PAF--by physical examination maintaining sinus rhythm.  He is on low-dose Eliquis.  He has had issues with chronic anemia - EGD 8/24 without clear source of bleeding, C-scope with diverticulosis and internal/external hemorrhoids.  Fatigue/shortness of breath--again this is not really a new issue but the patient has continued to be anemic and when I last saw him his hemoglobin was 8.9.  He has received iron " infusions but yet continues to be anemic.  Has had some active what sounds like lower GI bleeding with a history of colon cancer and he needs to be referred back to GI ASAP.  Also needs to follow up with PCP re. Ongoing anemia and need for eliquis.  Otherwise I believe the patient is in rhythm and he has no signs or symptoms of CHF.  He has a history of preserved LV systolic function.  Hypertension--blood pressures .  CKD--it is possible that some of his anemia is also from chronic kidney disease but his creatinine when last measured was 2.5.  He follows with nephrology and they have been ordering the iron infusion but interestingly his red blood cell indices do not indicate that this is iron deficiency.  Vitamin B12 level was within normal limits.  Continue to follow with nephrology.

## 2025-01-13 ENCOUNTER — LAB (OUTPATIENT)
Dept: LAB | Facility: LAB | Age: 85
End: 2025-01-13
Payer: MEDICARE

## 2025-01-13 DIAGNOSIS — M31.6 GCA (GIANT CELL ARTERITIS) (MULTI): Primary | ICD-10-CM

## 2025-01-13 DIAGNOSIS — M31.6 GCA (GIANT CELL ARTERITIS) (MULTI): ICD-10-CM

## 2025-01-13 LAB
CRP SERPL-MCNC: 0.21 MG/DL
ERYTHROCYTE [SEDIMENTATION RATE] IN BLOOD BY WESTERGREN METHOD: 7 MM/H (ref 0–20)

## 2025-01-13 PROCEDURE — 86140 C-REACTIVE PROTEIN: CPT

## 2025-01-13 PROCEDURE — 85652 RBC SED RATE AUTOMATED: CPT

## 2025-01-13 NOTE — PROGRESS NOTES
Assessment and Plan    03/17/2022 MRI brain with contrast, which I personally reviewed previously, shows bihemispheric mild white matter FLAIR changes and an area of left parietal cortical encephalomalacia consistent with prior ischemia.    03/07/2022 pathology “RIGHT TEMPORAL ARTERY, BIOPSY: --NEGATIVE FOR ARTERITIS --ATHEROSCLEROSIS”    Lab Results   Component Value Date/Time    SEDRATE 7 01/13/2025 1326    SEDRATE 24 (H) 07/05/2024 1524    SEDRATE 49 (H) 10/03/2023 1131    SEDRATE 26 (H) 02/17/2023 1532    SEDRATE 33 (H) 10/10/2022 1151    SEDRATE 53 (H) 08/31/2022 1350    SEDRATE 25 (H) 06/17/2022 1143    SEDRATE 3 04/08/2022 0600    SEDRATE 10 03/04/2022 1441    SEDRATE 17 02/21/2022 1444    SEDRATE 45 (H) 01/24/2022 1514    CRP 0.21 01/13/2025 1326    CRP 2.53 (H) 07/05/2024 1524    CRP 0.75 10/03/2023 1131    CRP 0.18 02/17/2023 1532    CRP 0.93 10/10/2022 1151    CRP 0.59 08/31/2022 1350    CRP 0.35 06/17/2022 1143    CRP 1.56 (A) 04/08/2022 0600    CRP 1.38 (A) 03/04/2022 1441    CRP 0.49 02/21/2022 1444    CRP 0.57 01/24/2022 1514     07/06/2022 ESR 34. CRP 0.43 mg/dL. ([facility])  05/04/2022 ESR 50. CRP 0.35 mg/dL.  04/29/2022 RF <10. MATT negative with negative CALVIN. ANCA, OH-3, MPO negative.    01/14/2025 OCT RNFL OD 35 & OS 80. (Stable)  07/09/2024 OCT RNFL OD 34 & OS 83. (Stable)  10/06/2023 OCT RNFL OD 41 & OS 82. (Stable right eye (OD), small decrease left eye (OS))  02/23/2023 OCT RNFL OD 36 & OS 85 (stable OD, about stable OS)  10/27/2022 OCT RNFL OD 36 & OS 88 (lower OD, about stable OS)   07/18/2022 OCT RNFL OD 40 & OS 95. (stable)  06/16/2022 OCT RNFL OD 42 & OS 93. (lower OD progressed to atrophy & higher OS)  03/04/2022 OCT RNFL  & OS 84. (reduced edema OD & stable OS)  02/21/2022 OCT RNFL  & OS 86.    01/14/2025 HVF 24-2 OD generalized depression MD -17.61 & OS wnl MD +0.31.  07/09/2024 HVF 24-2 OD stimulus V, fovea 28, inferior altitudinal & superior arcuate &  OS fovea 37, wnl MD -0.17.  10/06/2023 HVF 24-2 OD fovea 17, generalized depression MD -24.93 & OS fovea 33, rim artifact MD -8.89.   02/23/2023 HVF 24-2 OD fovea <0, generalized depression MD -22.49 & OS fovea 15, scatter MD -0.72.  10/27/2022 HVF 24-2 OD fovea 29, generalized depression with central sparing MD -25.92 & OS fovea 33, scatter MD -2.78   07/18/2022 HVF 24-2 OD stimulus V, fovea 35, generalized depression & OS fovea 36, scatter MD -2.62.  06/16/2022 HVF 24-2 OD fovea 27, generalized depression MD -25.82 & OS fovea 34, infeiror scatter MD -2.92.  03/04/2022 HVF 24-2 OD stimulus V, fovea 24, generalized, but embeded inferior worse than superior nasal loss  & OS fovea 31, scatter MD -2.43.  02/21/2022 HVF 24-2 OD stimulus V, fovea 28, generalized depression & OS fovea 35, scatter MD -3.43.    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    Vision remains stable with impairment on the right with a relative afferent pupillary defect. Laboratory studies of ESR & CRP yesterday returned reassuring results. I am most concerned that he had giant cell arteritis, but it seems to be in remission low. The left eye remains good.    Plan    Anemia monitoring and treatment.  Vasculopathic risk factor control.  Seek immediate attention for further vision loss.    Follow up in 1 year  with HVF & OCT. (dilated 7/9/2024)

## 2025-01-14 ENCOUNTER — APPOINTMENT (OUTPATIENT)
Dept: OPHTHALMOLOGY | Facility: CLINIC | Age: 85
End: 2025-01-14
Payer: MEDICARE

## 2025-01-14 DIAGNOSIS — M31.6 GCA (GIANT CELL ARTERITIS) (MULTI): Primary | ICD-10-CM

## 2025-01-14 DIAGNOSIS — H47.20 OPTIC ATROPHY OF RIGHT EYE: ICD-10-CM

## 2025-01-14 PROCEDURE — 92133 CPTRZD OPH DX IMG PST SGM ON: CPT | Performed by: PSYCHIATRY & NEUROLOGY

## 2025-01-14 PROCEDURE — 99214 OFFICE O/P EST MOD 30 MIN: CPT | Performed by: PSYCHIATRY & NEUROLOGY

## 2025-01-14 PROCEDURE — 92083 EXTENDED VISUAL FIELD XM: CPT | Performed by: PSYCHIATRY & NEUROLOGY

## 2025-01-14 ASSESSMENT — CONF VISUAL FIELD: OD_INFERIOR_NASAL_RESTRICTION: 1

## 2025-01-14 ASSESSMENT — ENCOUNTER SYMPTOMS
HEMATOLOGIC/LYMPHATIC NEGATIVE: 0
CONSTITUTIONAL NEGATIVE: 0
ENDOCRINE NEGATIVE: 0
MUSCULOSKELETAL NEGATIVE: 0
GASTROINTESTINAL NEGATIVE: 0
CARDIOVASCULAR NEGATIVE: 0
ALLERGIC/IMMUNOLOGIC NEGATIVE: 0
PSYCHIATRIC NEGATIVE: 0
EYES NEGATIVE: 1
NEUROLOGICAL NEGATIVE: 0
RESPIRATORY NEGATIVE: 0

## 2025-01-14 ASSESSMENT — TONOMETRY
OS_IOP_MMHG: 14
IOP_METHOD: GOLDMANN APPLANATION
OD_IOP_MMHG: 13

## 2025-01-14 ASSESSMENT — CUP TO DISC RATIO
OD_RATIO: 0.1
OS_RATIO: 0.1

## 2025-01-14 ASSESSMENT — VISUAL ACUITY
OD_SC: 20/70
OD_SC+: +1
OS_SC: 20/20
METHOD: SNELLEN - LINEAR

## 2025-01-14 ASSESSMENT — SLIT LAMP EXAM - LIDS
COMMENTS: NORMAL
COMMENTS: NORMAL

## 2025-01-14 ASSESSMENT — EXTERNAL EXAM - RIGHT EYE: OD_EXAM: NORMAL

## 2025-01-14 ASSESSMENT — EXTERNAL EXAM - LEFT EYE: OS_EXAM: NORMAL

## 2025-02-19 ENCOUNTER — TELEPHONE (OUTPATIENT)
Dept: CARDIOLOGY | Facility: HOSPITAL | Age: 85
End: 2025-02-19
Payer: MEDICARE

## 2025-02-19 NOTE — TELEPHONE ENCOUNTER
RN called pt at this time to check to see if he has follow up with GI, no answer, RN left message.

## 2025-02-20 NOTE — TELEPHONE ENCOUNTER
RN called pt at this time regarding following up with Gi and PCP, Pt states PCP is addressing that at this time. Pt states that kidney doctor is also monitor his creatinine level as well;

## 2025-03-27 ENCOUNTER — OFFICE VISIT (OUTPATIENT)
Dept: CARDIOLOGY | Facility: HOSPITAL | Age: 85
End: 2025-03-27
Payer: MEDICARE

## 2025-03-27 VITALS
HEART RATE: 60 BPM | DIASTOLIC BLOOD PRESSURE: 64 MMHG | BODY MASS INDEX: 27.63 KG/M2 | SYSTOLIC BLOOD PRESSURE: 150 MMHG | WEIGHT: 193 LBS | HEIGHT: 70 IN

## 2025-03-27 DIAGNOSIS — I48.0 PAROXYSMAL ATRIAL FIBRILLATION (MULTI): Primary | ICD-10-CM

## 2025-03-27 DIAGNOSIS — I10 BENIGN ESSENTIAL HYPERTENSION: ICD-10-CM

## 2025-03-27 PROCEDURE — 1036F TOBACCO NON-USER: CPT | Performed by: PHYSICIAN ASSISTANT

## 2025-03-27 PROCEDURE — 99213 OFFICE O/P EST LOW 20 MIN: CPT | Performed by: PHYSICIAN ASSISTANT

## 2025-03-27 PROCEDURE — 3078F DIAST BP <80 MM HG: CPT | Performed by: PHYSICIAN ASSISTANT

## 2025-03-27 PROCEDURE — 1160F RVW MEDS BY RX/DR IN RCRD: CPT | Performed by: PHYSICIAN ASSISTANT

## 2025-03-27 PROCEDURE — 3077F SYST BP >= 140 MM HG: CPT | Performed by: PHYSICIAN ASSISTANT

## 2025-03-27 PROCEDURE — 1159F MED LIST DOCD IN RCRD: CPT | Performed by: PHYSICIAN ASSISTANT

## 2025-03-27 RX ORDER — DARBEPOETIN ALFA 100 UG/.5ML
80 INJECTION, SOLUTION INTRAVENOUS; SUBCUTANEOUS
COMMUNITY

## 2025-03-27 RX ORDER — FAMOTIDINE 40 MG/1
1 TABLET, FILM COATED ORAL
COMMUNITY
Start: 2025-02-11

## 2025-03-27 ASSESSMENT — ENCOUNTER SYMPTOMS
ABDOMINAL PAIN: 0
NAUSEA: 0
VOMITING: 0
WEAKNESS: 0
PALPITATIONS: 0
SHORTNESS OF BREATH: 0
IRREGULAR HEARTBEAT: 1
ORTHOPNEA: 0
DIARRHEA: 0
FEVER: 0
DYSURIA: 0
WHEEZING: 0

## 2025-03-27 NOTE — PROGRESS NOTES
Cardiology Follow Up  Chief Complaint:   Patient is here for 6 month office visit.      History Of Present Illness:    Jacob Massey is a 84 y.o. male presenting for yearly follow up.  H/o pAfib, HTN, CKD, colon CA with partial colectomy, GIB in 2022 (Hb 6.7, required 4u pRBC) who presents for follow up.      Denies chest pain, but admits to some random heartburn, worse after he eats.   Denies any significant palps, irregular heart rate, dizziness.  No blood in stools or urine, no recent falls or head trauma.    Has rare swelling of the ankles which goes down with leg elevation.    Tells me he follows with a nephrologist, and they are aware that he is on amlodipine-valsartan.    He is telling that one of his pills was stopped due to lower blood pressure and anemia - however is not sure exactly which one.  Recent EGD unremarkable, and recent colonoscopy in 8/24 demonstrated diverticuli and internal/external hemorrhoids.  Denies any blood in the stool.  Has gotten an iron infusion, and has been having H/H followed by PCP.  He has not had to take lasix for swelling for at least one year.     ROS:  The remainder of the review of systems was obtained, as was negative as pertains to the chief complaint.     CV testing and labs:  8/2024  K 4.3  BUN 48  crea 2.74  H&H 10.3  30.7  2/2023  BMP  k 4.4 BUN 53, crea 2.50    CBC    H&H 8.9/28.3  4/2022 TTE   EF 60-65% stage I DD, left atrium moderately dilated,    3-27-25:  Here for follow up of visit in November.  Occasional second long jab of pain across chest but those are infrequent. Having issues with worsening renal function and is seeing Dr. Tomlin and there has been initial discussion about needing HD in future.  Patient is still working as an  and with normal functions has not been having any chest pain shortness of breath or any awareness or palpitations concerning for atrial fibrillation.  He is taking the Eliquis without any bleeding complications but he has  "now on Procrit injections for anemia.  Most of his labs are done through his outside nephrologist so I do not see those labs in our system.     Last Recorded Vitals:  Vitals:    03/27/25 1230   BP: 150/64   Pulse: 60   Weight: 87.5 kg (193 lb)   Height: 1.778 m (5' 10\")       Past Medical History:  He has a past medical history of Anemia, Atrial fibrillation (Multi), Chronic kidney disease, CKD (chronic kidney disease), stage IV (Multi), Colon cancer (Multi), Diabetes mellitus (Multi), GERD (gastroesophageal reflux disease), Hypertension, Prostate cancer (Multi), and Skin cancer.    Past Surgical History:  He has a past surgical history that includes Other surgical history (02/21/2022); Other surgical history (02/21/2022); CT angio abdomen pelvis w and or wo IV IV contrast (04/06/2022); Cataract extraction; Tonsillectomy; Cholecystectomy; Colonoscopy; and Upper gastrointestinal endoscopy.      Social History:  He reports that he has quit smoking. His smoking use included cigarettes. He has never used smokeless tobacco. He reports that he does not currently use alcohol. He reports that he does not use drugs.    Family History:  Family History   Problem Relation Name Age of Onset    Colon cancer Father          Allergies:  Tetracycline, Adhesive tape-silicones, Penicillin, Penicillin v potassium, and Tetracycline hcl    Outpatient Medications:  Current Outpatient Medications   Medication Instructions    acetaminophen (TylenoL) 325 mg tablet Take by mouth.    allopurinol (ZYLOPRIM) 100 mg    apixaban (Eliquis) 2.5 mg tablet 1 tablet, 2 times daily    Aranesp (in polysorbate) 80 mcg, Every 30 days    ascorbic acid (Vitamin C) 500 mg chewable tablet Chew.    carvedilol (Coreg) 12.5 mg tablet take 1 tablet by mouth twice a day with food for 90 days    cholecalciferol (VITAMIN D-3) 2,000 Units, Daily    famotidine (Pepcid) 40 mg tablet 1 tablet, Daily (0630)    ferrous sulfate 325 mg, 2 times daily    FreeStyle Tal 2 " Sensor kit as directed    Januvia 25 mg tablet 1 tablet, Daily    triamcinolone (Kenalog) 0.1 % cream 1 application to affected area Externally Twice a day as needed for active rash on body, not face, use sporadically contains steroid for 30 days    VITAMIN A ORAL Take by mouth.     Review of Systems   Constitutional: Negative for fever and malaise/fatigue.   Cardiovascular:  Positive for irregular heartbeat. Negative for chest pain, orthopnea and palpitations.        Occasional jab of pain lasting about 1 second across chest--infrequent   Respiratory:  Negative for shortness of breath and wheezing.    Skin:  Negative for itching and rash.   Gastrointestinal:  Negative for abdominal pain, diarrhea, nausea and vomiting.   Genitourinary:  Negative for dysuria.   Neurological:  Negative for weakness.      Physical Exam  Constitutional:       General: He is not in acute distress.     Appearance: Normal appearance.   HENT:      Mouth/Throat:      Mouth: Mucous membranes are moist.   Neck:      Comments: No JVD  Cardiovascular:      Rate and Rhythm: Normal rate and regular rhythm.      Heart sounds: Normal heart sounds. No murmur heard.  Pulmonary:      Effort: Pulmonary effort is normal.      Breath sounds: Normal breath sounds.   Abdominal:      General: Abdomen is flat. Bowel sounds are normal.      Palpations: Abdomen is soft.   Musculoskeletal:         General: No swelling.   Skin:     General: Skin is warm and dry.   Neurological:      Mental Status: He is alert and oriented to person, place, and time.   Psychiatric:         Mood and Affect: Mood normal.     Open okay sure you might have a few     Last Labs:  CBC -  Lab Results   Component Value Date    WBC 4.5 08/29/2024    HGB 10.3 (L) 08/29/2024    HCT 30.7 (L) 08/29/2024     (H) 08/29/2024     (L) 08/29/2024       CMP -  Lab Results   Component Value Date    CALCIUM 9.3 08/29/2024    PHOS 3.8 02/17/2023    PROT 4.6 (L) 04/16/2022    ALBUMIN 4.0  "02/17/2023    AST 9 04/16/2022    ALT 11 04/16/2022    ALKPHOS 70 04/16/2022    BILITOT 0.4 04/16/2022       LIPID PANEL -   No results found for: \"CHOL\", \"TRIG\", \"HDL\", \"CHHDL\", \"LDLF\", \"VLDL\", \"NHDL\"    RENAL FUNCTION PANEL -   Lab Results   Component Value Date    GLUCOSE 111 (H) 08/29/2024     08/29/2024    K 4.3 08/29/2024     (H) 08/29/2024    CO2 20 (L) 08/29/2024    ANIONGAP 14 08/29/2024    BUN 48 (H) 08/29/2024    CREATININE 2.74 (H) 08/29/2024    GFRMALE 25 (A) 02/17/2023    CALCIUM 9.3 08/29/2024    PHOS 3.8 02/17/2023    ALBUMIN 4.0 02/17/2023        Lab Results   Component Value Date    HGBA1C 5.9 (A) 02/17/2023       Last Cardiology Tests:    Echo:  CONCLUSIONS:   1. The left ventricular systolic function is normal with a 60-65% estimated ejection fraction.   2. Spectral Doppler shows an impaired relaxation pattern of left ventricular diastolic filling.   3. The left atrium is mild to moderately dilated.       No recent results to review    Assessment/Plan   Problem List Items Addressed This Visit             ICD-10-CM       Cardiac and Vasculature    A-fib (Multi) - Primary I48.91    Relevant Orders    Follow Up In Cardiology    Benign essential hypertension I10    Relevant Orders    Follow Up In Cardiology    PAF--in SR by exam. Continue BB and Eliquis.  HTN--BP's on current meds even with less meds at home 120-130's range.    Renal failure--following with nephrology closely.  Patient with anemia likely related to the chronic kidney disease and now is receiving Procrit shots again most of his labs are done through his nephrologist office and I do not see those results in our system.  Overall patient stable from cardiac standpoint.  He will continue to monitor blood pressures and if we see any upward trending of systolic blood pressures over 140s he is instructed to contact the office or any symptoms of atrial fibrillation.  Otherwise we will arrange follow-up with Dr. Aguirre in 6 months " time.      Joe Mejia PA-C  3/27/2025  12:58 PM

## 2025-03-31 ENCOUNTER — APPOINTMENT (OUTPATIENT)
Dept: CARDIOLOGY | Facility: HOSPITAL | Age: 85
End: 2025-03-31
Payer: MEDICARE

## 2025-04-29 ENCOUNTER — TELEPHONE (OUTPATIENT)
Dept: CARDIOLOGY | Facility: HOSPITAL | Age: 85
End: 2025-04-29
Payer: MEDICARE

## 2025-04-29 NOTE — TELEPHONE ENCOUNTER
RN called pt at this time with no answer, RN left a message for the patient to call back.     Per Dr. Aguirre needs an [facility] visit for EKG for bradycardia and h/o Afib

## 2025-04-30 ENCOUNTER — ANCILLARY PROCEDURE (OUTPATIENT)
Dept: CARDIOLOGY | Facility: HOSPITAL | Age: 85
End: 2025-04-30
Payer: MEDICARE

## 2025-04-30 ENCOUNTER — OFFICE VISIT (OUTPATIENT)
Dept: CARDIOLOGY | Facility: HOSPITAL | Age: 85
End: 2025-04-30
Payer: MEDICARE

## 2025-04-30 VITALS
OXYGEN SATURATION: 99 % | WEIGHT: 185 LBS | BODY MASS INDEX: 26.48 KG/M2 | SYSTOLIC BLOOD PRESSURE: 150 MMHG | HEART RATE: 48 BPM | DIASTOLIC BLOOD PRESSURE: 100 MMHG | HEIGHT: 70 IN

## 2025-04-30 DIAGNOSIS — I10 BENIGN ESSENTIAL HYPERTENSION: Primary | ICD-10-CM

## 2025-04-30 DIAGNOSIS — I10 PRIMARY HYPERTENSION: ICD-10-CM

## 2025-04-30 DIAGNOSIS — I48.0 PAROXYSMAL ATRIAL FIBRILLATION (MULTI): ICD-10-CM

## 2025-04-30 DIAGNOSIS — R00.1 BRADYCARDIA: ICD-10-CM

## 2025-04-30 DIAGNOSIS — I12.9 BENIGN HYPERTENSIVE KIDNEY DISEASE WITH CHRONIC KIDNEY DISEASE STAGE I THROUGH STAGE IV, OR UNSPECIFIED: ICD-10-CM

## 2025-04-30 LAB
ATRIAL RATE: 81 BPM
BODY SURFACE AREA: 2.04 M2
P AXIS: 73 DEGREES
P OFFSET: 150 MS
P ONSET: 107 MS
PR INTERVAL: 228 MS
Q ONSET: 221 MS
QRS COUNT: 8 BEATS
QRS DURATION: 132 MS
QT INTERVAL: 504 MS
QTC CALCULATION(BAZETT): 450 MS
QTC FREDERICIA: 467 MS
R AXIS: -15 DEGREES
T AXIS: 10 DEGREES
T OFFSET: 473 MS
VENTRICULAR RATE: 48 BPM

## 2025-04-30 PROCEDURE — 1159F MED LIST DOCD IN RCRD: CPT | Performed by: CLINICAL NURSE SPECIALIST

## 2025-04-30 PROCEDURE — 93005 ELECTROCARDIOGRAM TRACING: CPT | Performed by: CLINICAL NURSE SPECIALIST

## 2025-04-30 PROCEDURE — 93247 EXT ECG>7D<15D SCAN A/R: CPT

## 2025-04-30 PROCEDURE — 3080F DIAST BP >= 90 MM HG: CPT | Performed by: CLINICAL NURSE SPECIALIST

## 2025-04-30 PROCEDURE — 99214 OFFICE O/P EST MOD 30 MIN: CPT | Performed by: CLINICAL NURSE SPECIALIST

## 2025-04-30 PROCEDURE — 3077F SYST BP >= 140 MM HG: CPT | Performed by: CLINICAL NURSE SPECIALIST

## 2025-04-30 PROCEDURE — 1036F TOBACCO NON-USER: CPT | Performed by: CLINICAL NURSE SPECIALIST

## 2025-04-30 PROCEDURE — 1160F RVW MEDS BY RX/DR IN RCRD: CPT | Performed by: CLINICAL NURSE SPECIALIST

## 2025-04-30 PROCEDURE — 99214 OFFICE O/P EST MOD 30 MIN: CPT | Mod: 25 | Performed by: CLINICAL NURSE SPECIALIST

## 2025-04-30 RX ORDER — AMLODIPINE BESYLATE 5 MG/1
5 TABLET ORAL DAILY
Qty: 30 TABLET | Refills: 11 | Status: SHIPPED | OUTPATIENT
Start: 2025-04-30 | End: 2026-04-30

## 2025-04-30 ASSESSMENT — ENCOUNTER SYMPTOMS
HEMATURIA: 0
SHORTNESS OF BREATH: 0
EYES NEGATIVE: 1
DIZZINESS: 1
DYSPNEA ON EXERTION: 1
BRUISES/BLEEDS EASILY: 0
SYNCOPE: 0
LIGHT-HEADEDNESS: 1
ENDOCRINE NEGATIVE: 1
GASTROINTESTINAL NEGATIVE: 1
PALPITATIONS: 1
MUSCULOSKELETAL NEGATIVE: 1
NEAR-SYNCOPE: 0
HEMATEMESIS: 0

## 2025-04-30 NOTE — PATIENT INSTRUCTIONS
If you become symptomatic (dizziness/lightheadedness, feeling like you are going to pass out)  iand symptoms do not improve with holding carvedilol, I recommend you go to ED for further evaluation.   Please wear monitor for 5-7 days to monitor your bradycardia and mail back as instructed  Stop Carvedilol as you are doing  An echocardiogram has been ordered to evaluate for any structural abnormalities with your heart.   Start taking amlodipine 5 mg daily for elevated blood pressure with holding carvedilol.   A referral for EP team has been placed to evaluate Holter and provide additional recommendations.   Call our office with any new cardiac concerns  Continue current medications  Continue heart-healthy diet. A diet low in sodium, low in cholesterol, limiting red meats and eating whole foods.   Follow up with  Dr. García (EP) after Holter  Follow up with Dr. Aguirre in 3 months.

## 2025-04-30 NOTE — PROGRESS NOTES
"Primary Cardiologist: Dr. Aguirre    Chief Complaint:   Follow-up (Low Hr and Hx of A-Fib )     History Of Present Illness:    Jacob Massey \"Mendoza" is a 85 y.o. male with past medical history of pAfib, HTN, CKD, colon CA with partial colectomy, GIB in 2022 (Hb 6.7, required 4u pRBC) who presents with concerns for bradycardia.     Today, Jacob Massey \"Mendoza" is feeling tired. He reports that over the last few weeks he has been fatigued, occasionally with dizziness/lightheadedness. He denies chest pain or pressure, has shortness of breath with exertion. He reports he was evaluated yesterday by his PCP who found him to be bradycardic and he came in today for office visit.     Last Recorded Vitals:  Visit Vitals  BP (!) 150/100 (BP Location: Right arm, Patient Position: Sitting, BP Cuff Size: Adult)   Pulse (!) 48   Ht 1.778 m (5' 10\")   Wt 83.9 kg (185 lb)   SpO2 99%   BMI 26.54 kg/m²   Smoking Status Former   BSA 2.04 m²        Past Medical History:  He has a past medical history of Anemia, Atrial fibrillation (Multi), Chronic kidney disease, CKD (chronic kidney disease), stage IV (Multi), Colon cancer (Multi), Diabetes mellitus (Multi), GERD (gastroesophageal reflux disease), Hypertension, Prostate cancer (Multi), and Skin cancer.    Past Surgical History:  He has a past surgical history that includes Other surgical history (02/21/2022); Other surgical history (02/21/2022); CT angio abdomen pelvis w and or wo IV IV contrast (04/06/2022); Cataract extraction; Tonsillectomy; Cholecystectomy; Colonoscopy; and Upper gastrointestinal endoscopy.      Social History:  He reports that he has quit smoking. His smoking use included cigarettes. He has never used smokeless tobacco. He reports that he does not currently use alcohol. He reports that he does not use drugs.    Family History:  Family History[1]     Allergies:  Tetracycline, Adhesive tape-silicones, Penicillin, Penicillin v potassium, and Tetracycline " hcl    Outpatient Medications:  Current Outpatient Medications   Medication Instructions    acetaminophen (TylenoL) 325 mg tablet Take by mouth.    allopurinol (ZYLOPRIM) 100 mg    amLODIPine (NORVASC) 5 mg, oral, Daily    apixaban (Eliquis) 2.5 mg tablet 1 tablet, 2 times daily    Aranesp (in polysorbate) 80 mcg, Every 30 days    ascorbic acid (Vitamin C) 500 mg chewable tablet Chew.    cholecalciferol (VITAMIN D-3) 2,000 Units, Daily    famotidine (Pepcid) 40 mg tablet 1 tablet, Daily (0630)    ferrous sulfate 325 mg, 2 times daily    FreeStyle Tal 2 Sensor kit as directed    Januvia 25 mg tablet 1 tablet, Daily    triamcinolone (Kenalog) 0.1 % cream 1 application to affected area Externally Twice a day as needed for active rash on body, not face, use sporadically contains steroid for 30 days    VITAMIN A ORAL Take by mouth.         Review of Systems   Constitutional: Positive for malaise/fatigue.   HENT: Negative.     Eyes: Negative.    Cardiovascular:  Positive for dyspnea on exertion, leg swelling (chronic, not worrisome) and palpitations. Negative for chest pain, near-syncope and syncope.   Respiratory:  Negative for shortness of breath.    Endocrine: Negative.    Hematologic/Lymphatic: Does not bruise/bleed easily.   Skin: Negative.    Musculoskeletal: Negative.    Gastrointestinal: Negative.  Negative for hematemesis and melena.   Genitourinary:  Negative for hematuria.   Neurological:  Positive for dizziness (improved this morning) and light-headedness.        Physical Exam  Vitals reviewed.   Constitutional:       Appearance: Normal appearance.   HENT:      Head: Normocephalic.      Mouth/Throat:      Mouth: Mucous membranes are moist.   Cardiovascular:      Rate and Rhythm: Bradycardia present. Rhythm irregular.      Pulses: Normal pulses.      Heart sounds: Normal heart sounds.   Pulmonary:      Effort: Pulmonary effort is normal.      Breath sounds: Normal breath sounds. No wheezing, rhonchi or rales.  "  Abdominal:      General: Bowel sounds are normal. There is no distension.      Palpations: Abdomen is soft.      Tenderness: There is no abdominal tenderness.   Musculoskeletal:      Cervical back: Normal range of motion.      Right lower leg: No edema.      Left lower leg: No edema.   Skin:     General: Skin is warm and dry.   Neurological:      General: No focal deficit present.      Mental Status: He is alert and oriented to person, place, and time.   Psychiatric:         Mood and Affect: Mood normal.         Behavior: Behavior normal.           Last Labs:  CBC -  Lab Results   Component Value Date    WBC 4.5 08/29/2024    HGB 10.3 (L) 08/29/2024    HCT 30.7 (L) 08/29/2024     (H) 08/29/2024     (L) 08/29/2024       CMP -  Lab Results   Component Value Date    CALCIUM 9.3 08/29/2024    PHOS 3.8 02/17/2023    PROT 4.6 (L) 04/16/2022    ALBUMIN 4.0 02/17/2023    AST 9 04/16/2022    ALT 11 04/16/2022    ALKPHOS 70 04/16/2022    BILITOT 0.4 04/16/2022       LIPID PANEL -   No results found for: \"CHOL\", \"TRIG\", \"HDL\", \"CHHDL\", \"LDLF\", \"VLDL\", \"NHDL\"    RENAL FUNCTION PANEL -   Lab Results   Component Value Date    GLUCOSE 111 (H) 08/29/2024     08/29/2024    K 4.3 08/29/2024     (H) 08/29/2024    CO2 20 (L) 08/29/2024    ANIONGAP 14 08/29/2024    BUN 48 (H) 08/29/2024    CREATININE 2.74 (H) 08/29/2024    GFRMALE 25 (A) 02/17/2023    CALCIUM 9.3 08/29/2024    PHOS 3.8 02/17/2023    ALBUMIN 4.0 02/17/2023        Lab Results   Component Value Date    HGBA1C 5.9 (A) 02/17/2023       Last Cardiology Tests:  ECG:  Sinus bradycardia with 2nd degree AVB 2:1 AV conduction, RBBB, PVC      Echo:  TTE 4/7/22:   . The left ventricular systolic function is normal with a 60-65% estimated ejection fraction.   2. Spectral Doppler shows an impaired relaxation pattern of left ventricular diastolic filling.   3. The left atrium is mild to moderately dilated.  Ejection Fractions:  No results found for: " "\"EF\"    Cath:  No results found for this or any previous visit from the past 1095 days.      Stress Test:      Cardiac Imaging:  No results found for this or any previous visit from the past 1095 days.        Lab review: I have personally reviewed the laboratory result(s)     Assessment/Plan     Jacob Massey \"Doug\" is a 85 y.o. male with past medical history of pAfib, HTN, CKD, colon CA with partial colectomy, GIB in 2022 (Hb 6.7, required 4u pRBC) who presents with concerns for bradycardia.     Paroxysmal atrial fibrillation  TTE 4/2022: Normal LVEF, diastolic dysfunction  ECG today: Sinus bradycardia with 2nd degree AVB 2:1 AV conduction, RBBB.  He reports he has been fatigued lately, occasionally with dizziness/lightheadedness and shortness of breath. Denies any presyncope/syncope.   He has held one dose of carvedilol upon presentation to office today.  Recent labs reviewed from Dr. Gomez office 4/24: BUN 37/Cr 2.5 CBC with Hgb 10 (stable).   -Hold carvedilol for bradycardia  Continue on Apixaban for thromboembolism ppx, last H/H 10.3/30.7 (8/2024)  -TTE ordered today   -TSH ordered today  - EP to evaluate after Holter  - Message sent to RN to call patient with HR/BP readings in 48 hours.   - Patient to go to ED if starts to experience worsening symptoms, patient and wife verbalized understanding.     Hypertension  Today's /100  Elevated today and at home  With stopping carvedilol, will add Amlodipine 5 mg daily    CKD, stage IV  Last BUN/Cr 8/29/24: 48/2.74  Follows with Nephrology    GIB (2022)/History of anemia  Patient reports no abnormal bleeding  H/H stable on recent labs last week      Marianne Guillaume, GURPREET-CNP          [1]   Family History  Problem Relation Name Age of Onset    Colon cancer Father       "

## 2025-05-01 ENCOUNTER — HOSPITAL ENCOUNTER (OUTPATIENT)
Dept: CARDIOLOGY | Facility: HOSPITAL | Age: 85
Discharge: HOME | End: 2025-05-01
Payer: MEDICARE

## 2025-05-01 DIAGNOSIS — R00.1 BRADYCARDIA: ICD-10-CM

## 2025-05-01 DIAGNOSIS — I48.0 PAROXYSMAL ATRIAL FIBRILLATION (MULTI): ICD-10-CM

## 2025-05-01 LAB
AORTIC VALVE MEAN GRADIENT: 12 MMHG
AORTIC VALVE PEAK VELOCITY: 2.45 M/S
AV PEAK GRADIENT: 24 MMHG
AVA (PEAK VEL): 1.8 CM2
AVA (VTI): 1.83 CM2
EJECTION FRACTION APICAL 4 CHAMBER: 75.3
EJECTION FRACTION: 63 %
LEFT ATRIUM VOLUME AREA LENGTH INDEX BSA: 44 ML/M2
LEFT VENTRICLE INTERNAL DIMENSION DIASTOLE: 5.79 CM (ref 3.5–6)
LEFT VENTRICULAR OUTFLOW TRACT DIAMETER: 2.13 CM
LV EJECTION FRACTION BIPLANE: 75 %
MITRAL VALVE E/A RATIO: 1.72
MITRAL VALVE E/E' RATIO: 26.72
RIGHT VENTRICLE FREE WALL PEAK S': 15.33 CM/S
RIGHT VENTRICLE PEAK SYSTOLIC PRESSURE: 62.4 MMHG
TRICUSPID ANNULAR PLANE SYSTOLIC EXCURSION: 2.6 CM

## 2025-05-01 PROCEDURE — 93306 TTE W/DOPPLER COMPLETE: CPT | Performed by: INTERNAL MEDICINE

## 2025-05-01 PROCEDURE — 93306 TTE W/DOPPLER COMPLETE: CPT

## 2025-05-02 ENCOUNTER — TELEPHONE (OUTPATIENT)
Dept: CARDIOLOGY | Facility: HOSPITAL | Age: 85
End: 2025-05-02
Payer: MEDICARE

## 2025-05-02 NOTE — TELEPHONE ENCOUNTER
RN called pt at this time regarding his BP and HR readings at this time. Pt is sending over lab work at this time. Pt also states he has a some bright red blood when having BM yesterday and today. Denies any pain, HA, blurred vision. He states he is more concern with the bright red blood. RN notified NP and routed note.    Readings:  5/1  166/94, 72 HR  170/76, 79 HR  181/78, HR 75  177/76, HR 76          ----- Message from Marianne Guillaume sent at 4/30/2025 12:03 PM EDT -----  Regarding: Call patient  Please call patient to retrieve BP/HR readings

## 2025-05-02 NOTE — TELEPHONE ENCOUNTER
RN called pt at this time regarding BP and HR. No answer at this time. RN left message for patient to call back.      ----- Message from Marianne Guillaume sent at 4/30/2025 12:03 PM EDT -----  Regarding: Call patient  Please call patient to retrieve BP/HR readings

## 2025-05-13 ENCOUNTER — APPOINTMENT (OUTPATIENT)
Dept: CARDIOLOGY | Facility: HOSPITAL | Age: 85
End: 2025-05-13
Payer: MEDICARE

## 2025-05-21 NOTE — PROGRESS NOTES
Electrophysiology Visit    History of Present Illness:  Jacob Massey is a 85 y.o. year old male patient with history of atrial fibrillation, bradycardia, hypertension, CKD stage IV, colon CA with partial colectomy, diabetes, GI bleed requiring transfusion, anemia.    Patient referred to electrophysiology due to bradycardia and AV block.  Patient presented to PCP had found him bradycardic with heart rate in the 40s. He followed up with general cardiology in April 2024 due to fatigue with occasional dizziness and lightheadedness. Carvedilol was placed on hold.    Patient here with his wife today for evaluation of AV block.  He reports significant improvement in lightheadedness since carvedilol was stopped last month. He has also followed up with nephrology over the past couple weeks with worsening CKD and anemia.  Hemoglobin was 6.7 and he was transfused earlier this week.  Patient reports Dr. Tomlin is starting the process for peritoneal dialysis.  Patient denies palpitations or syncope.    Medical History:  He has a past medical history of Anemia, Atrial fibrillation (Multi), Chronic kidney disease, CKD (chronic kidney disease), stage IV (Multi), Colon cancer (Multi), Diabetes mellitus (Multi), GERD (gastroesophageal reflux disease), Hypertension, Prostate cancer (Multi), and Skin cancer.    Surgical History:  He has a past surgical history that includes Other surgical history (02/21/2022); Other surgical history (02/21/2022); CT angio abdomen pelvis w and or wo IV IV contrast (04/06/2022); Cataract extraction; Tonsillectomy; Cholecystectomy; Colonoscopy; and Upper gastrointestinal endoscopy.     Social History:  He reports that he has quit smoking. His smoking use included cigarettes. He has never used smokeless tobacco. He reports that he does not currently use alcohol. He reports that he does not use drugs.       Family History[1]     ROS:  A 14 point review of systems was done and is negative other than as  stated in HPI    Physical Exam  Constitutional:       Appearance: Normal appearance.   Eyes:      Pupils: Pupils are equal, round, and reactive to light.   Cardiovascular:      Rate and Rhythm: Normal rate and regular rhythm.      Heart sounds: Normal heart sounds.   Pulmonary:      Effort: Pulmonary effort is normal.      Breath sounds: Normal breath sounds.   Musculoskeletal:         General: Normal range of motion.   Skin:     General: Skin is warm and dry.   Neurological:      Mental Status: He is alert and oriented to person, place, and time.     Allergies:  Tetracycline, Adhesive tape-silicones, Penicillin, Penicillin v potassium, and Tetracycline hcl    Outpatient Medications:  Current Outpatient Medications   Medication Instructions    acetaminophen (TylenoL) 325 mg tablet Take by mouth.    allopurinol (ZYLOPRIM) 100 mg    amLODIPine (Norvasc) 10 mg tablet 1 tablet, Daily (0630)    apixaban (Eliquis) 2.5 mg tablet 1 tablet, 2 times daily    Aranesp (in polysorbate) 80 mcg, Every 30 days    ascorbic acid (Vitamin C) 500 mg chewable tablet Chew.    cholecalciferol (VITAMIN D-3) 2,000 Units, Daily    famotidine (Pepcid) 40 mg tablet 1 tablet, Daily (0630)    ferrous sulfate 325 mg, 2 times daily    FreeStyle Tal 2 Sensor kit as directed    Januvia 25 mg tablet 1 tablet, Daily    triamcinolone (Kenalog) 0.1 % cream 1 application to affected area Externally Twice a day as needed for active rash on body, not face, use sporadically contains steroid for 30 days    VITAMIN A ORAL Take by mouth.      Reviewed Labs:  CBC  Lab Results   Component Value Date    WBC 4.5 08/29/2024    HGB 10.3 (L) 08/29/2024    HCT 30.7 (L) 08/29/2024     (H) 08/29/2024     (L) 08/29/2024      Renal Function Panel  Lab Results   Component Value Date    GLUCOSE 111 (H) 08/29/2024     08/29/2024    K 4.3 08/29/2024     (H) 08/29/2024    CO2 20 (L) 08/29/2024    ANIONGAP 14 08/29/2024    BUN 48 (H) 08/29/2024     "CREATININE 2.74 (H) 08/29/2024    GFRMALE 25 (A) 02/17/2023    CALCIUM 9.3 08/29/2024      CMP  Lab Results   Component Value Date    CALCIUM 9.3 08/29/2024    PHOS 3.8 02/17/2023    PROT 4.6 (L) 04/16/2022    ALBUMIN 4.0 02/17/2023    AST 9 04/16/2022    ALT 11 04/16/2022    ALKPHOS 70 04/16/2022    BILITOT 0.4 04/16/2022      Mg   Lab Results   Component Value Date    MG 1.70 04/19/2022      Thyroid  Lab Results   Component Value Date    TSH 5.70 (H) 04/07/2022        Visit Vitals  /70   Pulse 94   Ht 1.778 m (5' 10\")   Wt 83.9 kg (185 lb)   BMI 26.54 kg/m²   Smoking Status Former   BSA 2.04 m²        Cardiac Testing Reviewed Study(s):  Echo (May/2025):   CONCLUSIONS:   1. Left ventricular ejection fraction is normal, by visual estimate at 60-65%.   2. Spectral Doppler shows a a Grade III (restrictive pattern) of left ventricular diastolic filling with an elevated left atrial pressure.   3. There is normal right ventricular global systolic function.   4. The left atrial size is moderately dilated.   5. Moderate to severely elevated right ventricular systolic pressure.   6. Mild aortic valve stenosis.  Monitor (April/May/2025):  Average heart rate 71 bpm  <1% SVC burden  <1% PVC burden  Excessive episodes of 2:1 second-degree AV block   Single episode of NSVT 4 beats  No atrial fibrillation detected  Symptoms correlated with AV block  ECGs (reviewed and my interpretation):   5/23/2025 sinus rhythm with heart rate of 94 bpm with RBBB and LAFB     Assessment  AV block:  2:1AV block noted during cardiology visit in April 2025. Coreg discontinued  7 day monitor in April 2025 showed excessive episodes of 2:1 AV block  Echocardiogram May 2025 showed normal LV function 60-65%    ECG today personally reviewed which shows sinus rhythm with bifascicular block.  Patient feeling better with less fatigue and lightheadedness since carvedilol was discontinued. He was also had significant anemia and received PRBC x2 " transfusion this month.  Per patient, Dr Tomlin, nephrology, is planning to start patient on periotenial dialysis with in the next month.  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  Will also keep in mind patient may need hemodialysis in future.  I will review case with Dr García and call patient with plan.     Atrial fibrillation:  Noted on event monitor in 2022 with 11% burden  Paroxsymal in nature  Continued on OAC though has significant anemia  Will need to discuss possible Watchman in order to stop OAC    Plan  Continue to avoid rate slowing medications  Will review case with Dr. García and call patient with recommendations      Exclusive of any other services or procedures performed, I, Elaine Shin, GURPREET-CNP , spent 30 minutes in duration for this visit today.  This time consisted of chart review, obtaining history, and/or performing the exam as documented above as well as documenting the clinical information for the encounter in the electronic record, discussing treatment options, plans, and/or goals with patient, family, and/or caregiver, refilling medications, updating the electronic record, ordering medicines, lab work, imaging, referrals, and/or procedures as documented above and communicating with other [facility] professionals. I have discussed the results of laboratory, radiology, and cardiology studies with the patient and their family/caregiver.          [1]   Family History  Problem Relation Name Age of Onset    Colon cancer Father

## 2025-05-23 ENCOUNTER — OFFICE VISIT (OUTPATIENT)
Dept: CARDIOLOGY | Facility: HOSPITAL | Age: 85
End: 2025-05-23
Payer: MEDICARE

## 2025-05-23 ENCOUNTER — TELEPHONE (OUTPATIENT)
Dept: CARDIOLOGY | Facility: HOSPITAL | Age: 85
End: 2025-05-23
Payer: MEDICARE

## 2025-05-23 VITALS
WEIGHT: 185 LBS | DIASTOLIC BLOOD PRESSURE: 70 MMHG | HEIGHT: 70 IN | SYSTOLIC BLOOD PRESSURE: 132 MMHG | BODY MASS INDEX: 26.48 KG/M2 | HEART RATE: 94 BPM

## 2025-05-23 DIAGNOSIS — I48.0 PAROXYSMAL ATRIAL FIBRILLATION (MULTI): ICD-10-CM

## 2025-05-23 DIAGNOSIS — I44.1 AV BLOCK, MOBITZ 2: Primary | ICD-10-CM

## 2025-05-23 DIAGNOSIS — Z79.01 CURRENT USE OF LONG TERM ANTICOAGULATION: ICD-10-CM

## 2025-05-23 LAB — BODY SURFACE AREA: 2.04 M2

## 2025-05-23 PROCEDURE — 99214 OFFICE O/P EST MOD 30 MIN: CPT | Mod: 25 | Performed by: NURSE PRACTITIONER

## 2025-05-23 PROCEDURE — 93248 EXT ECG>7D<15D REV&INTERPJ: CPT | Performed by: INTERNAL MEDICINE

## 2025-05-23 RX ORDER — AMLODIPINE BESYLATE 10 MG/1
1 TABLET ORAL
COMMUNITY
Start: 2025-05-13

## 2025-05-23 NOTE — TELEPHONE ENCOUNTER
Abnormal Results -   April 30- May 7th   PT had 11,943 episodes   2nd degree av block type 2    2 days and 23 hour   V vamsi Martínez from [employer] called and left vm stating that they had abnormal results for this pt   Called back this morning to get results listed above.

## 2025-05-28 ENCOUNTER — APPOINTMENT (OUTPATIENT)
Dept: RADIOLOGY | Facility: HOSPITAL | Age: 85
End: 2025-05-28
Payer: MEDICARE

## 2025-05-28 ENCOUNTER — TELEPHONE (OUTPATIENT)
Dept: CARDIOLOGY | Facility: HOSPITAL | Age: 85
End: 2025-05-28
Payer: MEDICARE

## 2025-05-28 ENCOUNTER — HOSPITAL ENCOUNTER (INPATIENT)
Facility: HOSPITAL | Age: 85
LOS: 2 days | Discharge: HOME | End: 2025-05-30
Attending: STUDENT IN AN ORGANIZED HEALTH CARE EDUCATION/TRAINING PROGRAM | Admitting: INTERNAL MEDICINE
Payer: MEDICARE

## 2025-05-28 ENCOUNTER — APPOINTMENT (OUTPATIENT)
Dept: CARDIOLOGY | Facility: HOSPITAL | Age: 85
End: 2025-05-28
Payer: MEDICARE

## 2025-05-28 DIAGNOSIS — I44.2 COMPLETE HEART BLOCK: Primary | ICD-10-CM

## 2025-05-28 DIAGNOSIS — D64.9 ANEMIA, UNSPECIFIED TYPE: ICD-10-CM

## 2025-05-28 DIAGNOSIS — I10 PRIMARY HYPERTENSION: ICD-10-CM

## 2025-05-28 DIAGNOSIS — N18.4 CKD (CHRONIC KIDNEY DISEASE), STAGE IV (MULTI): ICD-10-CM

## 2025-05-28 LAB
ABO GROUP (TYPE) IN BLOOD: NORMAL
ALBUMIN SERPL BCP-MCNC: 3.6 G/DL (ref 3.4–5)
ALP SERPL-CCNC: 73 U/L (ref 33–136)
ALT SERPL W P-5'-P-CCNC: 17 U/L (ref 10–52)
ANION GAP SERPL CALC-SCNC: 17 MMOL/L (ref 10–20)
ANTIBODY SCREEN: NORMAL
AST SERPL W P-5'-P-CCNC: 15 U/L (ref 9–39)
ATRIAL RATE: 107 BPM
ATRIAL RATE: 108 BPM
BASOPHILS # BLD AUTO: 0.03 X10*3/UL (ref 0–0.1)
BASOPHILS NFR BLD AUTO: 0.5 %
BILIRUB SERPL-MCNC: 0.6 MG/DL (ref 0–1.2)
BNP SERPL-MCNC: 493 PG/ML (ref 0–99)
BUN SERPL-MCNC: 60 MG/DL (ref 6–23)
CALCIUM SERPL-MCNC: 8.9 MG/DL (ref 8.6–10.3)
CARDIAC TROPONIN I PNL SERPL HS: 47 NG/L (ref 0–20)
CARDIAC TROPONIN I PNL SERPL HS: 49 NG/L (ref 0–20)
CHLORIDE SERPL-SCNC: 110 MMOL/L (ref 98–107)
CO2 SERPL-SCNC: 16 MMOL/L (ref 21–32)
CREAT SERPL-MCNC: 4.87 MG/DL (ref 0.5–1.3)
EGFRCR SERPLBLD CKD-EPI 2021: 11 ML/MIN/1.73M*2
EOSINOPHIL # BLD AUTO: 0.22 X10*3/UL (ref 0–0.4)
EOSINOPHIL NFR BLD AUTO: 3.7 %
ERYTHROCYTE [DISTWIDTH] IN BLOOD BY AUTOMATED COUNT: 17.7 % (ref 11.5–14.5)
GLUCOSE BLD MANUAL STRIP-MCNC: 110 MG/DL (ref 74–99)
GLUCOSE BLD MANUAL STRIP-MCNC: 120 MG/DL (ref 74–99)
GLUCOSE SERPL-MCNC: 150 MG/DL (ref 74–99)
HCT VFR BLD AUTO: 27.2 % (ref 41–52)
HGB BLD-MCNC: 8.4 G/DL (ref 13.5–17.5)
IMM GRANULOCYTES # BLD AUTO: 0.02 X10*3/UL (ref 0–0.5)
IMM GRANULOCYTES NFR BLD AUTO: 0.3 % (ref 0–0.9)
LYMPHOCYTES # BLD AUTO: 2.17 X10*3/UL (ref 0.8–3)
LYMPHOCYTES NFR BLD AUTO: 36.4 %
MAGNESIUM SERPL-MCNC: 2.4 MG/DL (ref 1.6–2.4)
MCH RBC QN AUTO: 30.9 PG (ref 26–34)
MCHC RBC AUTO-ENTMCNC: 30.9 G/DL (ref 32–36)
MCV RBC AUTO: 100 FL (ref 80–100)
MONOCYTES # BLD AUTO: 0.64 X10*3/UL (ref 0.05–0.8)
MONOCYTES NFR BLD AUTO: 10.7 %
NEUTROPHILS # BLD AUTO: 2.88 X10*3/UL (ref 1.6–5.5)
NEUTROPHILS NFR BLD AUTO: 48.4 %
NRBC BLD-RTO: 0 /100 WBCS (ref 0–0)
P AXIS: 0 DEGREES
PHOSPHATE SERPL-MCNC: 4 MG/DL (ref 2.5–4.9)
PLATELET # BLD AUTO: 223 X10*3/UL (ref 150–450)
POTASSIUM SERPL-SCNC: 4.4 MMOL/L (ref 3.5–5.3)
PR INTERVAL: 69 MS
PROT SERPL-MCNC: 6.3 G/DL (ref 6.4–8.2)
Q ONSET: 220 MS
Q ONSET: 252 MS
QRS COUNT: 6 BEATS
QRS COUNT: 6 BEATS
QRS DURATION: 134 MS
QRS DURATION: 150 MS
QT INTERVAL: 638 MS
QT INTERVAL: 646 MS
QTC CALCULATION(BAZETT): 485 MS
QTC CALCULATION(BAZETT): 508 MS
QTC FREDERICIA: 535 MS
QTC FREDERICIA: 547 MS
R AXIS: -29 DEGREES
R AXIS: 37 DEGREES
RBC # BLD AUTO: 2.72 X10*6/UL (ref 4.5–5.9)
RH FACTOR (ANTIGEN D): NORMAL
SODIUM SERPL-SCNC: 139 MMOL/L (ref 136–145)
T AXIS: -25 DEGREES
T AXIS: 9 DEGREES
T OFFSET: 543 MS
T OFFSET: 571 MS
VENTRICULAR RATE: 34 BPM
VENTRICULAR RATE: 38 BPM
WBC # BLD AUTO: 6 X10*3/UL (ref 4.4–11.3)

## 2025-05-28 PROCEDURE — 82947 ASSAY GLUCOSE BLOOD QUANT: CPT

## 2025-05-28 PROCEDURE — 86901 BLOOD TYPING SEROLOGIC RH(D): CPT | Performed by: NURSE PRACTITIONER

## 2025-05-28 PROCEDURE — 99222 1ST HOSP IP/OBS MODERATE 55: CPT | Performed by: INTERNAL MEDICINE

## 2025-05-28 PROCEDURE — 2500000004 HC RX 250 GENERAL PHARMACY W/ HCPCS (ALT 636 FOR OP/ED): Performed by: NURSE PRACTITIONER

## 2025-05-28 PROCEDURE — 93005 ELECTROCARDIOGRAM TRACING: CPT

## 2025-05-28 PROCEDURE — 71045 X-RAY EXAM CHEST 1 VIEW: CPT | Mod: FOREIGN READ | Performed by: RADIOLOGY

## 2025-05-28 PROCEDURE — 84484 ASSAY OF TROPONIN QUANT: CPT | Performed by: NURSE PRACTITIONER

## 2025-05-28 PROCEDURE — 2020000001 HC ICU ROOM DAILY

## 2025-05-28 PROCEDURE — 2500000001 HC RX 250 WO HCPCS SELF ADMINISTERED DRUGS (ALT 637 FOR MEDICARE OP): Performed by: NURSE PRACTITIONER

## 2025-05-28 PROCEDURE — 36415 COLL VENOUS BLD VENIPUNCTURE: CPT | Performed by: NURSE PRACTITIONER

## 2025-05-28 PROCEDURE — 76770 US EXAM ABDO BACK WALL COMP: CPT

## 2025-05-28 PROCEDURE — 85025 COMPLETE CBC W/AUTO DIFF WBC: CPT | Performed by: NURSE PRACTITIONER

## 2025-05-28 PROCEDURE — 2500000001 HC RX 250 WO HCPCS SELF ADMINISTERED DRUGS (ALT 637 FOR MEDICARE OP): Performed by: REGISTERED NURSE

## 2025-05-28 PROCEDURE — 84100 ASSAY OF PHOSPHORUS: CPT | Performed by: NURSE PRACTITIONER

## 2025-05-28 PROCEDURE — 99291 CRITICAL CARE FIRST HOUR: CPT | Performed by: NURSE PRACTITIONER

## 2025-05-28 PROCEDURE — 83735 ASSAY OF MAGNESIUM: CPT | Performed by: NURSE PRACTITIONER

## 2025-05-28 PROCEDURE — 83880 ASSAY OF NATRIURETIC PEPTIDE: CPT | Performed by: NURSE PRACTITIONER

## 2025-05-28 PROCEDURE — 71045 X-RAY EXAM CHEST 1 VIEW: CPT

## 2025-05-28 PROCEDURE — 99223 1ST HOSP IP/OBS HIGH 75: CPT | Performed by: NURSE PRACTITIONER

## 2025-05-28 PROCEDURE — 80053 COMPREHEN METABOLIC PANEL: CPT | Performed by: NURSE PRACTITIONER

## 2025-05-28 RX ORDER — ASCORBIC ACID 500 MG
500 TABLET ORAL DAILY
Status: DISCONTINUED | OUTPATIENT
Start: 2025-05-28 | End: 2025-05-30 | Stop reason: HOSPADM

## 2025-05-28 RX ORDER — CHOLECALCIFEROL (VITAMIN D3) 25 MCG
50 TABLET ORAL DAILY
Status: DISCONTINUED | OUTPATIENT
Start: 2025-05-28 | End: 2025-05-30 | Stop reason: HOSPADM

## 2025-05-28 RX ORDER — PANTOPRAZOLE SODIUM 40 MG/1
40 TABLET, DELAYED RELEASE ORAL
COMMUNITY

## 2025-05-28 RX ORDER — THIAMINE HCL 50 MG
50 TABLET ORAL DAILY
COMMUNITY

## 2025-05-28 RX ORDER — LANOLIN ALCOHOL/MO/W.PET/CERES
50 CREAM (GRAM) TOPICAL DAILY
Status: DISCONTINUED | OUTPATIENT
Start: 2025-05-28 | End: 2025-05-30 | Stop reason: HOSPADM

## 2025-05-28 RX ORDER — ONDANSETRON 4 MG/1
4 TABLET, FILM COATED ORAL EVERY 8 HOURS PRN
Status: DISCONTINUED | OUTPATIENT
Start: 2025-05-28 | End: 2025-05-30 | Stop reason: HOSPADM

## 2025-05-28 RX ORDER — FERROUS SULFATE 325(65) MG
1 TABLET ORAL 2 TIMES DAILY
Status: DISCONTINUED | OUTPATIENT
Start: 2025-05-28 | End: 2025-05-30 | Stop reason: HOSPADM

## 2025-05-28 RX ORDER — ALLOPURINOL 100 MG/1
100 TABLET ORAL DAILY
Status: DISCONTINUED | OUTPATIENT
Start: 2025-05-28 | End: 2025-05-30 | Stop reason: HOSPADM

## 2025-05-28 RX ORDER — HEPARIN SODIUM 5000 [USP'U]/ML
5000 INJECTION, SOLUTION INTRAVENOUS; SUBCUTANEOUS EVERY 8 HOURS
Status: DISCONTINUED | OUTPATIENT
Start: 2025-05-28 | End: 2025-05-30

## 2025-05-28 RX ORDER — ONDANSETRON HYDROCHLORIDE 2 MG/ML
4 INJECTION, SOLUTION INTRAVENOUS EVERY 8 HOURS PRN
Status: DISCONTINUED | OUTPATIENT
Start: 2025-05-28 | End: 2025-05-30 | Stop reason: HOSPADM

## 2025-05-28 RX ORDER — AMLODIPINE BESYLATE 10 MG/1
10 TABLET ORAL
Status: DISCONTINUED | OUTPATIENT
Start: 2025-05-29 | End: 2025-05-30

## 2025-05-28 RX ORDER — DEXTROSE 50 % IN WATER (D50W) INTRAVENOUS SYRINGE
12.5
Status: DISCONTINUED | OUTPATIENT
Start: 2025-05-28 | End: 2025-05-30 | Stop reason: HOSPADM

## 2025-05-28 RX ORDER — GUAIFENESIN 600 MG/1
600 TABLET, EXTENDED RELEASE ORAL EVERY 12 HOURS PRN
Status: DISCONTINUED | OUTPATIENT
Start: 2025-05-28 | End: 2025-05-30 | Stop reason: HOSPADM

## 2025-05-28 RX ORDER — SODIUM BICARBONATE 325 MG/1
1300 TABLET ORAL 3 TIMES DAILY
Status: DISCONTINUED | OUTPATIENT
Start: 2025-05-28 | End: 2025-05-29

## 2025-05-28 RX ORDER — PANTOPRAZOLE SODIUM 40 MG/1
40 TABLET, DELAYED RELEASE ORAL
Status: DISCONTINUED | OUTPATIENT
Start: 2025-05-29 | End: 2025-05-30 | Stop reason: HOSPADM

## 2025-05-28 RX ORDER — EPOETIN ALFA-EPBX 40000 [IU]/ML
40000 INJECTION, SOLUTION INTRAVENOUS; SUBCUTANEOUS
COMMUNITY

## 2025-05-28 RX ORDER — DEXTROSE 50 % IN WATER (D50W) INTRAVENOUS SYRINGE
25
Status: DISCONTINUED | OUTPATIENT
Start: 2025-05-28 | End: 2025-05-30 | Stop reason: HOSPADM

## 2025-05-28 RX ORDER — SODIUM BICARBONATE 325 MG/1
650 TABLET ORAL 3 TIMES DAILY
Status: DISCONTINUED | OUTPATIENT
Start: 2025-05-28 | End: 2025-05-28

## 2025-05-28 RX ORDER — BISACODYL 5 MG
10 TABLET, DELAYED RELEASE (ENTERIC COATED) ORAL DAILY PRN
Status: DISCONTINUED | OUTPATIENT
Start: 2025-05-28 | End: 2025-05-30 | Stop reason: HOSPADM

## 2025-05-28 RX ORDER — INSULIN LISPRO 100 [IU]/ML
0-10 INJECTION, SOLUTION INTRAVENOUS; SUBCUTANEOUS
Status: DISCONTINUED | OUTPATIENT
Start: 2025-05-28 | End: 2025-05-30 | Stop reason: HOSPADM

## 2025-05-28 RX ORDER — PANTOPRAZOLE SODIUM 40 MG/10ML
40 INJECTION, POWDER, LYOPHILIZED, FOR SOLUTION INTRAVENOUS
Status: DISCONTINUED | OUTPATIENT
Start: 2025-05-29 | End: 2025-05-30 | Stop reason: HOSPADM

## 2025-05-28 RX ORDER — TALC
3 POWDER (GRAM) TOPICAL NIGHTLY PRN
Status: DISCONTINUED | OUTPATIENT
Start: 2025-05-28 | End: 2025-05-30 | Stop reason: HOSPADM

## 2025-05-28 RX ORDER — POLYETHYLENE GLYCOL 3350 17 G/17G
17 POWDER, FOR SOLUTION ORAL DAILY
Status: DISCONTINUED | OUTPATIENT
Start: 2025-05-28 | End: 2025-05-30 | Stop reason: HOSPADM

## 2025-05-28 RX ADMIN — ALLOPURINOL 100 MG: 100 TABLET ORAL at 16:27

## 2025-05-28 RX ADMIN — FERROUS SULFATE TAB 325 MG (65 MG ELEMENTAL FE) 1 TABLET: 325 (65 FE) TAB at 20:02

## 2025-05-28 RX ADMIN — SODIUM BICARBONATE 650 MG: 325 TABLET ORAL at 14:38

## 2025-05-28 RX ADMIN — HEPARIN SODIUM 5000 UNITS: 5000 INJECTION, SOLUTION INTRAVENOUS; SUBCUTANEOUS at 19:56

## 2025-05-28 RX ADMIN — THIAMINE HCL TAB 100 MG 50 MG: 100 TAB at 14:35

## 2025-05-28 RX ADMIN — SODIUM BICARBONATE 1300 MG: 325 TABLET ORAL at 20:02

## 2025-05-28 RX ADMIN — FERROUS SULFATE TAB 325 MG (65 MG ELEMENTAL FE) 1 TABLET: 325 (65 FE) TAB at 14:35

## 2025-05-28 RX ADMIN — Medication 50 MCG: at 14:35

## 2025-05-28 RX ADMIN — OXYCODONE HYDROCHLORIDE AND ACETAMINOPHEN 500 MG: 500 TABLET ORAL at 14:35

## 2025-05-28 SDOH — SOCIAL STABILITY: SOCIAL INSECURITY: WITHIN THE LAST YEAR, HAVE YOU BEEN AFRAID OF YOUR PARTNER OR EX-PARTNER?: NO

## 2025-05-28 SDOH — ECONOMIC STABILITY: HOUSING INSECURITY: IN THE LAST 12 MONTHS, WAS THERE A TIME WHEN YOU WERE NOT ABLE TO PAY THE MORTGAGE OR RENT ON TIME?: NO

## 2025-05-28 SDOH — ECONOMIC STABILITY: HOUSING INSECURITY: AT ANY TIME IN THE PAST 12 MONTHS, WERE YOU HOMELESS OR LIVING IN A SHELTER (INCLUDING NOW)?: NO

## 2025-05-28 SDOH — SOCIAL STABILITY: SOCIAL INSECURITY: HAVE YOU HAD THOUGHTS OF HARMING ANYONE ELSE?: NO

## 2025-05-28 SDOH — SOCIAL STABILITY: SOCIAL INSECURITY: WITHIN THE LAST YEAR, HAVE YOU BEEN HUMILIATED OR EMOTIONALLY ABUSED IN OTHER WAYS BY YOUR PARTNER OR EX-PARTNER?: NO

## 2025-05-28 SDOH — HEALTH STABILITY: PHYSICAL HEALTH: ON AVERAGE, HOW MANY MINUTES DO YOU ENGAGE IN EXERCISE AT THIS LEVEL?: 20 MIN

## 2025-05-28 SDOH — SOCIAL STABILITY: SOCIAL NETWORK: IN A TYPICAL WEEK, HOW MANY TIMES DO YOU TALK ON THE PHONE WITH FAMILY, FRIENDS, OR NEIGHBORS?: THREE TIMES A WEEK

## 2025-05-28 SDOH — SOCIAL STABILITY: SOCIAL NETWORK: HOW OFTEN DO YOU GET TOGETHER WITH FRIENDS OR RELATIVES?: THREE TIMES A WEEK

## 2025-05-28 SDOH — ECONOMIC STABILITY: TRANSPORTATION INSECURITY: IN THE PAST 12 MONTHS, HAS LACK OF TRANSPORTATION KEPT YOU FROM MEDICAL APPOINTMENTS OR FROM GETTING MEDICATIONS?: NO

## 2025-05-28 SDOH — ECONOMIC STABILITY: INCOME INSECURITY: IN THE PAST 12 MONTHS HAS THE ELECTRIC, GAS, OIL, OR WATER COMPANY THREATENED TO SHUT OFF SERVICES IN YOUR HOME?: NO

## 2025-05-28 SDOH — SOCIAL STABILITY: SOCIAL INSECURITY
WITHIN THE LAST YEAR, HAVE YOU BEEN KICKED, HIT, SLAPPED, OR OTHERWISE PHYSICALLY HURT BY YOUR PARTNER OR EX-PARTNER?: NO

## 2025-05-28 SDOH — ECONOMIC STABILITY: FOOD INSECURITY: HOW HARD IS IT FOR YOU TO PAY FOR THE VERY BASICS LIKE FOOD, HOUSING, MEDICAL CARE, AND HEATING?: NOT HARD AT ALL

## 2025-05-28 SDOH — HEALTH STABILITY: PHYSICAL HEALTH: ON AVERAGE, HOW MANY DAYS PER WEEK DO YOU ENGAGE IN MODERATE TO STRENUOUS EXERCISE (LIKE A BRISK WALK)?: 3 DAYS

## 2025-05-28 SDOH — ECONOMIC STABILITY: FOOD INSECURITY: WITHIN THE PAST 12 MONTHS, THE FOOD YOU BOUGHT JUST DIDN'T LAST AND YOU DIDN'T HAVE MONEY TO GET MORE.: NEVER TRUE

## 2025-05-28 SDOH — ECONOMIC STABILITY: FOOD INSECURITY: WITHIN THE PAST 12 MONTHS, YOU WORRIED THAT YOUR FOOD WOULD RUN OUT BEFORE YOU GOT THE MONEY TO BUY MORE.: NEVER TRUE

## 2025-05-28 SDOH — HEALTH STABILITY: PHYSICAL HEALTH
HOW OFTEN DO YOU NEED TO HAVE SOMEONE HELP YOU WHEN YOU READ INSTRUCTIONS, PAMPHLETS, OR OTHER WRITTEN MATERIAL FROM YOUR DOCTOR OR PHARMACY?: NEVER

## 2025-05-28 SDOH — ECONOMIC STABILITY: HOUSING INSECURITY: IN THE PAST 12 MONTHS, HOW MANY TIMES HAVE YOU MOVED WHERE YOU WERE LIVING?: 0

## 2025-05-28 SDOH — HEALTH STABILITY: MENTAL HEALTH
DO YOU FEEL STRESS - TENSE, RESTLESS, NERVOUS, OR ANXIOUS, OR UNABLE TO SLEEP AT NIGHT BECAUSE YOUR MIND IS TROUBLED ALL THE TIME - THESE DAYS?: NOT AT ALL

## 2025-05-28 SDOH — SOCIAL STABILITY: SOCIAL INSECURITY
WITHIN THE LAST YEAR, HAVE YOU BEEN RAPED OR FORCED TO HAVE ANY KIND OF SEXUAL ACTIVITY BY YOUR PARTNER OR EX-PARTNER?: NO

## 2025-05-28 SDOH — SOCIAL STABILITY: SOCIAL NETWORK
DO YOU BELONG TO ANY CLUBS OR ORGANIZATIONS SUCH AS CHURCH GROUPS, UNIONS, FRATERNAL OR ATHLETIC GROUPS, OR SCHOOL GROUPS?: YES

## 2025-05-28 SDOH — SOCIAL STABILITY: SOCIAL INSECURITY: ARE YOU MARRIED, WIDOWED, DIVORCED, SEPARATED, NEVER MARRIED, OR LIVING WITH A PARTNER?: MARRIED

## 2025-05-28 SDOH — SOCIAL STABILITY: SOCIAL NETWORK: HOW OFTEN DO YOU ATTEND MEETINGS OF THE CLUBS OR ORGANIZATIONS YOU BELONG TO?: MORE THAN 4 TIMES PER YEAR

## 2025-05-28 SDOH — SOCIAL STABILITY: SOCIAL NETWORK: HOW OFTEN DO YOU ATTEND CHURCH OR RELIGIOUS SERVICES?: MORE THAN 4 TIMES PER YEAR

## 2025-05-28 SDOH — SOCIAL STABILITY: SOCIAL INSECURITY: WERE YOU ABLE TO COMPLETE ALL THE BEHAVIORAL HEALTH SCREENINGS?: YES

## 2025-05-28 ASSESSMENT — ACTIVITIES OF DAILY LIVING (ADL)
JUDGMENT_ADEQUATE_SAFELY_COMPLETE_DAILY_ACTIVITIES: YES
TOILETING: INDEPENDENT
WALKS IN HOME: INDEPENDENT
HEARING - LEFT EAR: FUNCTIONAL
LACK_OF_TRANSPORTATION: NO
GROOMING: INDEPENDENT
PATIENT'S MEMORY ADEQUATE TO SAFELY COMPLETE DAILY ACTIVITIES?: YES
DRESSING YOURSELF: INDEPENDENT
ADEQUATE_TO_COMPLETE_ADL: YES
HEARING - RIGHT EAR: FUNCTIONAL
FEEDING YOURSELF: INDEPENDENT
LACK_OF_TRANSPORTATION: NO
ASSISTIVE_DEVICE: EYEGLASSES
BATHING: INDEPENDENT

## 2025-05-28 ASSESSMENT — COGNITIVE AND FUNCTIONAL STATUS - GENERAL
DAILY ACTIVITIY SCORE: 24
PATIENT BASELINE BEDBOUND: NO
CLIMB 3 TO 5 STEPS WITH RAILING: A LITTLE
MOBILITY SCORE: 23
MOBILITY SCORE: 24
DAILY ACTIVITIY SCORE: 24

## 2025-05-28 ASSESSMENT — PATIENT HEALTH QUESTIONNAIRE - PHQ9
SUM OF ALL RESPONSES TO PHQ9 QUESTIONS 1 & 2: 0
1. LITTLE INTEREST OR PLEASURE IN DOING THINGS: NOT AT ALL
2. FEELING DOWN, DEPRESSED OR HOPELESS: NOT AT ALL

## 2025-05-28 ASSESSMENT — LIFESTYLE VARIABLES
HOW OFTEN DO YOU HAVE A DRINK CONTAINING ALCOHOL: NEVER
PRESCIPTION_ABUSE_PAST_12_MONTHS: NO
AUDIT-C TOTAL SCORE: 0
SUBSTANCE_ABUSE_PAST_12_MONTHS: NO
HOW OFTEN DO YOU HAVE 6 OR MORE DRINKS ON ONE OCCASION: NEVER
AUDIT-C TOTAL SCORE: 0
HOW MANY STANDARD DRINKS CONTAINING ALCOHOL DO YOU HAVE ON A TYPICAL DAY: PATIENT DOES NOT DRINK
SKIP TO QUESTIONS 9-10: 1

## 2025-05-28 ASSESSMENT — PAIN SCALES - GENERAL
PAINLEVEL_OUTOF10: 0 - NO PAIN

## 2025-05-28 ASSESSMENT — ENCOUNTER SYMPTOMS
NAUSEA: 0
DIZZINESS: 1
VOMITING: 0
LIGHT-HEADEDNESS: 1
ABDOMINAL DISTENTION: 0
PALPITATIONS: 1
FEVER: 0
FACIAL ASYMMETRY: 0
SEIZURES: 0
NECK PAIN: 0
SPEECH DIFFICULTY: 0
CHOKING: 0
WHEEZING: 0
SHORTNESS OF BREATH: 0
DIARRHEA: 1
ACTIVITY CHANGE: 1
SHORTNESS OF BREATH: 1
COLOR CHANGE: 0
PALPITATIONS: 0
FATIGUE: 1
ABDOMINAL PAIN: 0
AGITATION: 0
POLYPHAGIA: 0
APPETITE CHANGE: 0

## 2025-05-28 ASSESSMENT — COLUMBIA-SUICIDE SEVERITY RATING SCALE - C-SSRS
6. HAVE YOU EVER DONE ANYTHING, STARTED TO DO ANYTHING, OR PREPARED TO DO ANYTHING TO END YOUR LIFE?: NO
1. IN THE PAST MONTH, HAVE YOU WISHED YOU WERE DEAD OR WISHED YOU COULD GO TO SLEEP AND NOT WAKE UP?: NO
2. HAVE YOU ACTUALLY HAD ANY THOUGHTS OF KILLING YOURSELF?: NO

## 2025-05-28 ASSESSMENT — PAIN - FUNCTIONAL ASSESSMENT
PAIN_FUNCTIONAL_ASSESSMENT: 0-10

## 2025-05-28 NOTE — H&P
"[facility] - GENERAL MEDICINE HISTORY AND PHYSICAL    HISTORY OF PRESENT ILLNESS     History Obtained From (Primary Source): The patient  Collateral History (Secondary Sources): D/w ED    History Of Present Illness (HPI):  Jacob Massey \"Doug\" is a 85 y.o. male with PMHx s/f kidney disease followed by [facility], atrial fibrillation, type 2 diabetes, GERD, Akers's esophagus, prior history of colon cancer, chronic anemia with periodic transfusions and erythropoietin injections presenting with bradycardia.  Patient has had some issues with bradycardia had worn an event monitor followed up with cardiology and electrophysiology.  At that time patient was found to have a second-degree AV block type II patient was discontinued from carvedilol and had been doing fair until a couple days back.  Patient has progressively become more lightheaded fatigued and short of breath.  Patient is having shortness of breath anytime he is doing anything other than sitting.  He had recently received transfusion he does complain of some lower extremity edema.  No chest pains no fevers chills or rigors.  Patient still makes urine no change in his urine output.  In the emergency department the patient was found to have profound bradycardia is unclear if this is second-degree type II AV block or complete heart block presently patient has significant bradycardia was seen by cardiology who advised admission to the ICU with consult electrophysiology likely for permanent pacemaker.  It is likely that patient's length of stay will exceed 2 midnights.    ED Course:   Vitals on presentation: T 36.9 °C (98.4 °F)  HR (!) 38  BP (!) 136/44  RR 20  O2 96 % None (Room air)  Labs:   CBC with WBC 6.0, Hgb 8.4, Plts 223.   CMP with glucose 150, Na 139, K 4.4, BUN 60, sCr 4.87, alk phos 73, ALT 17, AST 15, bilirubin 0.6. Magnesium 2.40.   . Trop 49.   Lactate No results found for requested labs within last 365 " days.  EKG: Appears consistent with complete heart block  Imaging - agree with radiology interpretation(s):   Interventions: Pacemaker pads applied    12-point ROS reviewed and found to be negative aside from aforementioned positives in HPI and/or noted in dedicated ROS section below.     Decision made to admit the patient to the hospitalist service after evaluation of the patient, review of the above, and discussion with ED provider.     LABS AND IMAGING     I have personally reviewed the following labs from 05/28/25: CBC, CMP, Troponin, and BNP  I have personally reviewed any obtained EKGs on 05/28/25, with my interpretation as listed above in the ED summary course.   I have personally reviewed the patient's vitals on presentation to the ED and any/all changes through to time of admission (on 05/28/25).     ED Course (From ED Provider):  Diagnoses as of 05/28/25 1325   Complete heart block   Anemia, unspecified type     Relevant Results  Results for orders placed or performed during the hospital encounter of 05/28/25 (from the past 24 hours)   CBC and Auto Differential   Result Value Ref Range    WBC 6.0 4.4 - 11.3 x10*3/uL    nRBC 0.0 0.0 - 0.0 /100 WBCs    RBC 2.72 (L) 4.50 - 5.90 x10*6/uL    Hemoglobin 8.4 (L) 13.5 - 17.5 g/dL    Hematocrit 27.2 (L) 41.0 - 52.0 %     80 - 100 fL    MCH 30.9 26.0 - 34.0 pg    MCHC 30.9 (L) 32.0 - 36.0 g/dL    RDW 17.7 (H) 11.5 - 14.5 %    Platelets 223 150 - 450 x10*3/uL    Neutrophils % 48.4 40.0 - 80.0 %    Immature Granulocytes %, Automated 0.3 0.0 - 0.9 %    Lymphocytes % 36.4 13.0 - 44.0 %    Monocytes % 10.7 2.0 - 10.0 %    Eosinophils % 3.7 0.0 - 6.0 %    Basophils % 0.5 0.0 - 2.0 %    Neutrophils Absolute 2.88 1.60 - 5.50 x10*3/uL    Immature Granulocytes Absolute, Automated 0.02 0.00 - 0.50 x10*3/uL    Lymphocytes Absolute 2.17 0.80 - 3.00 x10*3/uL    Monocytes Absolute 0.64 0.05 - 0.80 x10*3/uL    Eosinophils Absolute 0.22 0.00 - 0.40 x10*3/uL    Basophils  Absolute 0.03 0.00 - 0.10 x10*3/uL   Comprehensive metabolic panel   Result Value Ref Range    Glucose 150 (H) 74 - 99 mg/dL    Sodium 139 136 - 145 mmol/L    Potassium 4.4 3.5 - 5.3 mmol/L    Chloride 110 (H) 98 - 107 mmol/L    Bicarbonate 16 (L) 21 - 32 mmol/L    Anion Gap 17 10 - 20 mmol/L    Urea Nitrogen 60 (H) 6 - 23 mg/dL    Creatinine 4.87 (H) 0.50 - 1.30 mg/dL    eGFR 11 (L) >60 mL/min/1.73m*2    Calcium 8.9 8.6 - 10.3 mg/dL    Albumin 3.6 3.4 - 5.0 g/dL    Alkaline Phosphatase 73 33 - 136 U/L    Total Protein 6.3 (L) 6.4 - 8.2 g/dL    AST 15 9 - 39 U/L    Bilirubin, Total 0.6 0.0 - 1.2 mg/dL    ALT 17 10 - 52 U/L   B-Type Natriuretic Peptide   Result Value Ref Range     (H) 0 - 99 pg/mL   Phosphorus   Result Value Ref Range    Phosphorus 4.0 2.5 - 4.9 mg/dL   Magnesium   Result Value Ref Range    Magnesium 2.40 1.60 - 2.40 mg/dL   Troponin I, High Sensitivity, Initial   Result Value Ref Range    Troponin I, High Sensitivity 47 (H) 0 - 20 ng/L   ECG 12 lead   Result Value Ref Range    Ventricular Rate 38 BPM    Atrial Rate 107 BPM    MS Interval 69 ms    QRS Duration 150 ms    QT Interval 638 ms    QTC Calculation(Bazett) 508 ms    P Axis 0 degrees    R Axis 37 degrees    T Axis 9 degrees    QRS Count 6 beats    Q Onset 252 ms    T Offset 571 ms    QTC Fredericia 547 ms   Electrocardiogram, 12-lead PRN ACS symptoms   Result Value Ref Range    Ventricular Rate 34 BPM    Atrial Rate 108 BPM    QRS Duration 134 ms    QT Interval 646 ms    QTC Calculation(Bazett) 485 ms    R Axis -29 degrees    T Axis -25 degrees    QRS Count 6 beats    Q Onset 220 ms    T Offset 543 ms    QTC Fredericia 535 ms   Troponin, High Sensitivity, 1 Hour   Result Value Ref Range    Troponin I, High Sensitivity 49 (H) 0 - 20 ng/L      Imaging  XR chest 1 view  Result Date: 5/28/2025  Cardiomegaly and pulmonary vascular congestion.  Please correlate for component of fluid overload.  Mild basilar opacities.  Early pneumonia is not  excluded. Signed by Hernando Buenrostro DO      Cardiology, Vascular, and Other Imaging  Electrocardiogram, 12-lead PRN ACS symptoms  Result Date: 5/28/2025  Sinus tachycardia with 2nd degree AV block with 3:1 AV conduction Right bundle branch block Inferior infarct , age undetermined Abnormal ECG When compared with ECG of 28-MAY-2025 11:08, (unconfirmed) No significant change was found    ECG 12 lead  Result Date: 5/28/2025  Complete AV block with wide QRS complex Right bundle branch block         PAST HISTORIES AND ALLERGIES     Past Medical History  He has a past medical history of Anemia, Atrial fibrillation (Multi), Chronic kidney disease, CKD (chronic kidney disease), stage IV (Multi), Colon cancer (Multi), Diabetes mellitus (Multi), GERD (gastroesophageal reflux disease), Hypertension, Prostate cancer (Multi), and Skin cancer.    Surgical History  He has a past surgical history that includes Other surgical history (02/21/2022); Other surgical history (02/21/2022); CT angio abdomen pelvis w and or wo IV IV contrast (04/06/2022); Cataract extraction; Tonsillectomy; Cholecystectomy; Colonoscopy; and Upper gastrointestinal endoscopy.     Social History  He reports that he has quit smoking. His smoking use included cigarettes. He has never used smokeless tobacco. He reports that he does not currently use alcohol. He reports that he does not use drugs.    Family History  Family History[1]    Allergies  Tetracycline, Adhesive tape-silicones, Penicillin, Penicillin v potassium, and Tetracycline hcl    MEDICATIONS     Scheduled Medications:  Scheduled Medications[2]  Continuous Medications:  Continuous Medications[3]  PRN Medications:  PRN Medications[4]     REVIEW OF SYSTEMS     Review of Systems   Respiratory:  Positive for shortness of breath.    Cardiovascular:  Positive for palpitations and leg swelling.   Neurological:  Positive for light-headedness.       OBJECTIVE     Last Recorded Vitals  /53   Pulse  (!) 32   Temp 36.9 °C (98.4 °F)   Resp (!) 21   Wt 83.9 kg (185 lb)   SpO2 95%      Physical Exam:  Vital signs and nursing notes reviewed.   Constitutional: Pleasant and cooperative. Laying in bed in no acute distress. Conversant.   Skin: Warm and dry; no obvious lesions, rashes, pallor, or jaundice.   Eyes: EOMI. Anicteric sclera.   ENT: Mucous membranes moist; no obvious injury or deformity appreciated.   Head and Neck: Normocephalic, atraumatic. ROM preserved. Trachea midline.  There is mild JVD.   Respiratory: Nonlabored on room air. Lungs with bilateral crackles, excursion is fair and symmetrical cardiovascular: RRR. No gross murmur, gallop, or rub. Extremities are warm and well-perfused with good capillary refill (< 3 seconds). No chest wall tenderness.   GI: Abdomen soft, nontender, nondistended. No obvious organomegaly appreciated. Bowel sounds are present.  : No CVA tenderness.   MSK: No gross abnormalities appreciated. No limitations to AROM/PROM appreciated.   Extremities: No cyanosis, edema, or clubbing evident. Neurovascularly intact.   Neuro: A&Ox3. CN 2-12 grossly intact. Able to respond to questions appropriately and clearly. No acute focal neurologic deficits appreciated.  Psych: Appropriate mood and behavior.    ASSESSMENT AND PLAN   Assessment/Plan     85 y.o. male with PMHx s/f  kidney disease followed by [facility], atrial fibrillation, type 2 diabetes, GERD, Akers's esophagus, prior history of colon cancer, chronic anemia with periodic transfusions and erythropoietin injections presenting with bradycardia.     Symptomatic bradycardia secondary to complete heart block  Patient previously been taken off his beta-blockers  Patient having symptoms of lightheadedness fatigue shortness of breath  Patient is to be admitted to intensive care unit continue to wear pacemaker pads  Cardiology has been consulted  Will defer diuretics to avoid lowering blood pressure for  now      Paroxysmal atrial fibrillation   Negative chronotropic agents are on hold, will also hold Eliquis in anticipation of permanent pacemaker    Chronic kidney disease  Patient with evidence of pulmonary edema JVD peripheral edema  Will request consultation to nephrology    Chronic anemia  Patient recently transfused with 2 units of packed red blood cells  Continue to monitor hemoglobin    Type 2 diabetes  Hold oral hypoglycemic agents  Cover with sliding scale insulin  Diabetic and renal diet    History of Akers's esophagus  Continue PPI    VTE prophylaxis  SCDs and subcu heparin while Eliquis is on hold       Earnest Moore, APRN-CNP    Dragon dictation software was used to dictate this note and thus there may be minor errors in translation/transcription including garbled speech or misspellings. Please contact for clarification if needed.       [1]   Family History  Problem Relation Name Age of Onset    Colon cancer Father     [2] allopurinol, 100 mg, oral, Daily  [START ON 5/29/2025] amLODIPine, 10 mg, oral, Daily  [Held by provider] apixaban, 2.5 mg, oral, BID  cholecalciferol, 50 mcg, oral, Daily  heparin (porcine), 5,000 Units, subcutaneous, q8h  insulin lispro, 0-10 Units, subcutaneous, TID AC  [START ON 5/29/2025] pantoprazole, 40 mg, oral, Daily before breakfast   Or  [START ON 5/29/2025] pantoprazole, 40 mg, intravenous, Daily before breakfast  polyethylene glycol, 17 g, oral, Daily    [3]    [4] PRN medications: bisacodyl, dextrose, dextrose, glucagon, glucagon, guaiFENesin, melatonin, ondansetron **OR** ondansetron

## 2025-05-28 NOTE — PROGRESS NOTES
"Jacob Massey \"Doug\" is a 85 y.o. male admitted for Complete heart block. Pharmacy reviewed the patient's dahhy-pr-dyzrpjtue medications and allergies for accuracy.    The list below reflects the PTA list prior to pharmacy medication history. A summary a changes to the PTA medication list has been listed below. Please review each medication in order reconciliation for additional clarification and justification.    Source of information:  Surescripts and T2P Wife    Medications added:  Pantoprazole 40mg every day   B-1 50mg every day   Retacrit 40,000 units/ml 1 dose every 30 days    Medications modified:  Vitamin C 500mg --> 500mg every day   Vitamin A --> every day     Medications to be removed:  Apap 325mg   Aranesp 100mcg/0.5ml  Triamcinolone  0.1%  Medications of concern:      Prior to Admission Medications   Prescriptions Last Dose Informant Patient Reported? Taking?   FreeStyle Tal 2 Sensor kit   Yes No   Sig: as directed   Januvia 25 mg tablet   Yes No   Sig: Take 1 tablet (25 mg) by mouth once daily.   VITAMIN A ORAL   Yes No   Sig: Take by mouth.   acetaminophen (TylenoL) 325 mg tablet   Yes No   Sig: Take by mouth.   allopurinol (Zyloprim) 100 mg tablet   Yes No   Sig: Take 1 tablet (100 mg) by mouth.   amLODIPine (Norvasc) 10 mg tablet   Yes No   Sig: Take 1 tablet (10 mg) by mouth early in the morning..   apixaban (Eliquis) 2.5 mg tablet   Yes No   Sig: Take 1 tablet (2.5 mg) by mouth 2 times a day.   ascorbic acid (Vitamin C) 500 mg chewable tablet   Yes No   Sig: Chew.   cholecalciferol (Vitamin D-3) 50 MCG (2000 UT) tablet   Yes No   Sig: Take 1 tablet (2,000 Units) by mouth once daily.   darbepoetin phi (Aranesp, in polysorbate,) 100 mcg/0.5 mL injection   Yes No   Sig: Inject 0.4 mL (80 mcg) under the skin every 30 (thirty) days.   famotidine (Pepcid) 40 mg tablet   Yes No   Sig: Take 1 tablet (40 mg) by mouth early in the morning..   ferrous sulfate, 325 mg ferrous sulfate, tablet   Yes No "   Sig: Take 1 tablet by mouth 2 times a day.   triamcinolone (Kenalog) 0.1 % cream   Yes No   Si application to affected area Externally Twice a day as needed for active rash on body, not face, use sporadically contains steroid for 30 days      Facility-Administered Medications: None       Mariza Pressley

## 2025-05-28 NOTE — CONSULTS
Inpatient consult to Cardiology  Consult performed by: Timothy Aguirre MD  Consult ordered by: GURPREET Pittman-CNP  Reason for consult: complete herat block        History Of Present Illness:    Doug Massey is a 85 y.o. male presenting with weakness and dizziness  He has a history of atrial fibrillation, bradycardia, hypertension, CKD stage IV, colon CA with partial colectomy, diabetes, GI bleed requiring transfusion, anemia.      The patient presents due to lack of energy, weakness, and LH.  He has had worsening weakness and more recently has noticed worsening LH when doing things like walking or exerting.  He denies overt syncope.  He denies chest pain/pressure, current palps, SOB, LE edema, orthopnea.  In ED he was found to have HR in the 30's and episodes of complete AV block (alternating with 2:1 AVB on telemetry.  He is on no rate slowing medications.  His carvedilol had been stopped previously due to AVB.  Of note, tells me that he has been seeing nephrology and has plans to initiate peritoneal dialysis soon.      Review Of Systems:  The remainder of the review of systems was obtained, and was negative as pertains to the chief complaint.    Fhx:   reviewed and NC  SocHx:  does not smoke tobacco    Last Recorded Vitals:  Vitals:    05/28/25 1053 05/28/25 1110 05/28/25 1130 05/28/25 1200   BP: (!) 140/30 154/56 137/88    Pulse:  (!) 34 (!) 33 (!) 32   Resp:  (!) 21 20 (!) 21   Temp:       SpO2:  94% 94% 95%   Weight:       Height:           Last Labs:  CBC - 5/28/2025: 10:47 AM  6.0 8.4 223    27.2      CMP - 5/28/2025: 10:47 AM  8.9 6.3 15 --- 0.6   4.0 3.6 17 73      PTT - No results in last year.  _   _ _     Troponin I, High Sensitivity   Date/Time Value Ref Range Status   05/28/2025 10:47 AM 47 (H) 0 - 20 ng/L Final     BNP   Date/Time Value Ref Range Status   05/28/2025 10:47  (H) 0 - 99 pg/mL Final     Hemoglobin A1C   Date/Time Value Ref Range Status   02/17/2023 03:34 PM 5.9 (A) % Final      Comment:          Diagnosis of Diabetes-Adults   Non-Diabetic: < or = 5.6%   Increased risk for developing diabetes: 5.7-6.4%   Diagnostic of diabetes: > or = 6.5%  .       Monitoring of Diabetes                Age (y)     Therapeutic Goal (%)   Adults:          >18           <7.0   Pediatrics:    13-18           <7.5                   7-12           <8.0                   0- 6            7.5-8.5   American Diabetes Association. Diabetes Care 33(S1), Jan 2010.        Last I/O:  No intake/output data recorded.    Past Cardiology Tests (Last 3 Years):  EKG:  ECG 12 Lead 04/30/2025 (Preliminary)      ECG 12 Lead 11/13/2024      ECG 12 lead 08/29/2024      ECG 12 Lead 01/09/2024    Echo:  Transthoracic Echo (TTE) Complete 05/01/2025    Ejection Fractions:  EF   Date/Time Value Ref Range Status   05/01/2025 11:28 AM 63 %      Cath:  No results found for this or any previous visit from the past 1095 days.    Stress Test:  No results found for this or any previous visit from the past 1095 days.    Cardiac Imaging:  No results found for this or any previous visit from the past 1095 days.      Past Medical History:  He has a past medical history of Anemia, Atrial fibrillation (Multi), Chronic kidney disease, CKD (chronic kidney disease), stage IV (Multi), Colon cancer (Multi), Diabetes mellitus (Multi), GERD (gastroesophageal reflux disease), Hypertension, Prostate cancer (Multi), and Skin cancer.    Past Surgical History:  He has a past surgical history that includes Other surgical history (02/21/2022); Other surgical history (02/21/2022); CT angio abdomen pelvis w and or wo IV IV contrast (04/06/2022); Cataract extraction; Tonsillectomy; Cholecystectomy; Colonoscopy; and Upper gastrointestinal endoscopy.      Social History:  He reports that he has quit smoking. His smoking use included cigarettes. He has never used smokeless tobacco. He reports that he does not currently use alcohol. He reports that he does not use  drugs.    Family History:  Family History[1]     Allergies:  Tetracycline, Adhesive tape-silicones, Penicillin, Penicillin v potassium, and Tetracycline hcl    Inpatient Medications:  Scheduled Medications[2]  PRN Medications[3]  Continuous Medications[4]  Outpatient Medications:  Current Outpatient Medications   Medication Instructions    acetaminophen (TylenoL) 325 mg tablet Take by mouth.    allopurinol (ZYLOPRIM) 100 mg    amLODIPine (Norvasc) 10 mg tablet 1 tablet, Daily (0630)    apixaban (Eliquis) 2.5 mg tablet 1 tablet, 2 times daily    Aranesp (in polysorbate) 80 mcg, Every 30 days    ascorbic acid (Vitamin C) 500 mg chewable tablet Chew.    cholecalciferol (VITAMIN D-3) 2,000 Units, Daily    famotidine (Pepcid) 40 mg tablet 1 tablet, Daily (0630)    ferrous sulfate 325 mg, 2 times daily    FreeStyle Tal 2 Sensor kit as directed    Januvia 25 mg tablet 1 tablet, Daily    triamcinolone (Kenalog) 0.1 % cream 1 application to affected area Externally Twice a day as needed for active rash on body, not face, use sporadically contains steroid for 30 days    VITAMIN A ORAL Take by mouth.       Physical Exam:  Physical Exam  HENT:      Head: Normocephalic.      Mouth/Throat:      Mouth: Mucous membranes are moist.   Cardiovascular:      Rate and Rhythm: Bradycardia present. Rhythm irregular.   Pulmonary:      Effort: Pulmonary effort is normal.   Abdominal:      Palpations: Abdomen is soft.   Musculoskeletal:      Cervical back: Neck supple.   Skin:     General: Skin is warm.   Neurological:      Mental Status: He is alert and oriented to person, place, and time.   Psychiatric:         Mood and Affect: Mood normal.     Mild ankle edema b/l       Assessment/Plan   CHB / high grade AVB  Symptomatic bradycardia  pAfib  HTN    Symptomatic bradycardia:  LH/dizziness, with intermittent complete heart block.  Hemodynamically stable.  I discussed the case with electrophysiology (Dr. García) who is in agreement that  the patient would benefit from a PPM.  Recent TTE 5/1/25 with preserved LVEF 60-65% and no rWMA, grade III DD.  -keep NPO after MN  -hold apixaban  -PPM tomorrow  -monitor closely in ICU  -avoid rate slowing meds  -keep pads on chest in case there is a need for TC pacing overnight  -continue amlodipine 10mg daily    Code Status:  No Order    Timothy Aguirre MD         [1]   Family History  Problem Relation Name Age of Onset    Colon cancer Father     [2] [3] [4]

## 2025-05-28 NOTE — TELEPHONE ENCOUNTER
PT spouse called this morning in regards to Jacob being extremly exhausted and not being able to do much. BP readings were sent over to us via fax. Will scan into chart. Spouse is taking Jacob to ER

## 2025-05-28 NOTE — Clinical Note
The PACEMAKER, DUAL CHAMBER, SUSAN MRI XT DR - [id number] device was inserted. The leads were placed into the connector and visually verified to be in correct position. Injury current obtained.

## 2025-05-28 NOTE — CARE PLAN
The patient's goals for the shift include      The clinical goals for the shift include        Problem: Pain - Adult  Goal: Verbalizes/displays adequate comfort level or baseline comfort level  5/28/2025 1702 by Abdirashid Crenshaw RN  Outcome: Progressing  5/28/2025 1702 by Abdirashid Crenshaw RN  Outcome: Progressing     Problem: Safety - Adult  Goal: Free from fall injury  5/28/2025 1702 by Abdirashid Crenshaw RN  Outcome: Progressing  5/28/2025 1702 by Abdirashid Crenshaw RN  Outcome: Progressing     Problem: Discharge Planning  Goal: Discharge to home or other facility with appropriate resources  5/28/2025 1702 by Abdirashid Crenshaw RN  Outcome: Progressing  5/28/2025 1702 by Abdirashid Crenshaw RN  Outcome: Progressing     Problem: Chronic Conditions and Co-morbidities  Goal: Patient's chronic conditions and co-morbidity symptoms are monitored and maintained or improved  5/28/2025 1702 by Abdirashid Crenshaw RN  Outcome: Progressing  5/28/2025 1702 by Abdirashid Crenshaw RN  Outcome: Progressing     Problem: Nutrition  Goal: Nutrient intake appropriate for maintaining nutritional needs  5/28/2025 1702 by Abdirashid Cresnhaw RN  Outcome: Progressing  5/28/2025 1702 by Abdirashid Crenshaw RN  Outcome: Progressing

## 2025-05-28 NOTE — ED PROCEDURE NOTE
Procedure  Critical Care    Performed by: JIA Pittman  Authorized by: Av Mcghee DO    Critical care provider statement:     Critical care time (minutes):  35    Critical care time was exclusive of:  Separately billable procedures and treating other patients    Critical care was necessary to treat or prevent imminent or life-threatening deterioration of the following conditions:  Cardiac failure    Critical care was time spent personally by me on the following activities:  Development of treatment plan with patient or surrogate, discussions with consultants, examination of patient, obtaining history from patient or surrogate, ordering and review of laboratory studies, ordering and review of radiographic studies, re-evaluation of patient's condition and review of old charts    Care discussed with: admitting provider               JIA Pittman  05/28/25 [id number]

## 2025-05-28 NOTE — ED PROVIDER NOTES
"    HPI   Chief Complaint   Patient presents with    Bradycardia    Shortness of Breath       This is a 85-year-old  male, with a past medical history of hypertension, hyperlipidemia, atrial fibrillation, bradycardia, chronic kidney disease, GI bleed, colon CA, and type 2 diabetes, that is presenting to the emergency room with complaints of shortness of breath and bradycardia.  The patient reports that he first had his initial episode of bradycardia in October 2024.  He has been working with cardiology and saw the advanced practice practitioner last week.  They removed him from his beta-blocker.  He is still having transient periods of bradycardia.  The patient reports that he started feeling bad about a week ago.  It improved but returned on memorial day.  He has had worsening bradycardia over the past few days.  He is not having any chest pain.  Denies any dizziness, palpitations, paresthesias, focal weakness.  Denies any abdominal pain.  Patient does have a history of GI bleed that has required transfusions in the past.  He has noticed blood in his stools intermittently.  He is not having any headaches, vision changes, or syncope.  He is not experiencing any fevers or cold-like symptoms.      History provided by:  Patient   used: No                          [place] Coma Scale Score: 15                  Patient History   Medical History[1]  Surgical History[2]  Family History[3]  Social History[4]    Physical Exam   Visit Vitals  BP (!) 161/107   Pulse (!) 35   Temp 36.4 °C (97.5 °F) (Temporal)   Resp 20   Ht 1.778 m (5' 10\")   Wt 84 kg (185 lb 3 oz)   SpO2 95%   BMI 26.57 kg/m²   Smoking Status Former   BSA 2.04 m²      Physical Exam  Vitals and nursing note reviewed.   Constitutional:       Appearance: Normal appearance. He is normal weight.   HENT:      Head: Normocephalic and atraumatic.      Right Ear: Tympanic membrane normal.      Left Ear: Tympanic membrane normal.      Nose: " Nose normal.      Mouth/Throat:      Mouth: Mucous membranes are moist.      Pharynx: Oropharynx is clear.   Eyes:      Extraocular Movements: Extraocular movements intact.      Conjunctiva/sclera: Conjunctivae normal.      Pupils: Pupils are equal, round, and reactive to light.   Cardiovascular:      Rate and Rhythm: Regular rhythm. Bradycardia present.      Pulses: Normal pulses.   Pulmonary:      Effort: Pulmonary effort is normal.      Breath sounds: Decreased breath sounds present.   Abdominal:      General: Abdomen is flat. Bowel sounds are normal.      Palpations: Abdomen is soft.   Genitourinary:     Comments: No CVA tenderness or pubic pain.  Musculoskeletal:         General: Normal range of motion.      Right lower leg: Edema present.      Left lower leg: Edema present.   Skin:     General: Skin is warm and dry.      Capillary Refill: Capillary refill takes less than 2 seconds.   Neurological:      General: No focal deficit present.      Mental Status: He is alert and oriented to person, place, and time.   Psychiatric:         Mood and Affect: Mood normal.         Judgment: Judgment normal.         XR chest 1 view   Final Result   Cardiomegaly and pulmonary vascular congestion.  Please correlate for   component of fluid overload.  Mild basilar opacities.  Early pneumonia   is not excluded.   Signed by Hernando Buenrostro, DO      US renal complete    (Results Pending)   Electrophysiology procedure    (Results Pending)       Labs Reviewed   CBC WITH AUTO DIFFERENTIAL - Abnormal       Result Value    WBC 6.0      nRBC 0.0      RBC 2.72 (*)     Hemoglobin 8.4 (*)     Hematocrit 27.2 (*)           MCH 30.9      MCHC 30.9 (*)     RDW 17.7 (*)     Platelets 223      Neutrophils % 48.4      Immature Granulocytes %, Automated 0.3      Lymphocytes % 36.4      Monocytes % 10.7      Eosinophils % 3.7      Basophils % 0.5      Neutrophils Absolute 2.88      Immature Granulocytes Absolute, Automated 0.02       Lymphocytes Absolute 2.17      Monocytes Absolute 0.64      Eosinophils Absolute 0.22      Basophils Absolute 0.03     COMPREHENSIVE METABOLIC PANEL - Abnormal    Glucose 150 (*)     Sodium 139      Potassium 4.4      Chloride 110 (*)     Bicarbonate 16 (*)     Anion Gap 17      Urea Nitrogen 60 (*)     Creatinine 4.87 (*)     eGFR 11 (*)     Calcium 8.9      Albumin 3.6      Alkaline Phosphatase 73      Total Protein 6.3 (*)     AST 15      Bilirubin, Total 0.6      ALT 17     B-TYPE NATRIURETIC PEPTIDE - Abnormal     (*)     Narrative:        <100 pg/mL - Heart failure unlikely  100-299 pg/mL - Intermediate probability of acute heart                  failure exacerbation. Correlate with clinical                  context and patient history.    >=300 pg/mL - Heart Failure likely. Correlate with clinical                  context and patient history.    BNP testing is performed using different testing methodology at [facility] than at other [facility]. Direct result comparisons should only be made within the same method.      SERIAL TROPONIN-INITIAL - Abnormal    Troponin I, High Sensitivity 47 (*)     Narrative:     Less than 99th percentile of normal range cutoff-  Female and children under 18 years old <14 ng/L; Male <21 ng/L: Negative  Repeat testing should be performed if clinically indicated.     Female and children under 18 years old 14-50 ng/L; Male 21-50 ng/L:  Consistent with possible cardiac damage and possible increased clinical   risk. Serial measurements may help to assess extent of myocardial damage.     >50 ng/L: Consistent with cardiac damage, increased clinical risk and  myocardial infarction. Serial measurements may help assess extent of   myocardial damage.      NOTE: Children less than 1 year old may have higher baseline troponin   levels and results should be interpreted in conjunction with the overall   clinical context.     NOTE: Troponin I testing is performed using  a different   testing methodology at [facility] than at other   [facility]. Direct result comparisons should only   be made within the same method.   SERIAL TROPONIN, 1 HOUR - Abnormal    Troponin I, High Sensitivity 49 (*)     Narrative:     Less than 99th percentile of normal range cutoff-  Female and children under 18 years old <14 ng/L; Male <21 ng/L: Negative  Repeat testing should be performed if clinically indicated.     Female and children under 18 years old 14-50 ng/L; Male 21-50 ng/L:  Consistent with possible cardiac damage and possible increased clinical   risk. Serial measurements may help to assess extent of myocardial damage.     >50 ng/L: Consistent with cardiac damage, increased clinical risk and  myocardial infarction. Serial measurements may help assess extent of   myocardial damage.      NOTE: Children less than 1 year old may have higher baseline troponin   levels and results should be interpreted in conjunction with the overall   clinical context.     NOTE: Troponin I testing is performed using a different   testing methodology at [facility] than at other   [facility]. Direct result comparisons should only   be made within the same method.   POCT GLUCOSE - Abnormal    POCT Glucose 120 (*)    POCT GLUCOSE - Abnormal    POCT Glucose 110 (*)    PHOSPHORUS - Normal    Phosphorus 4.0     MAGNESIUM - Normal    Magnesium 2.40     TROPONIN SERIES- (INITIAL, 1 HR)    Narrative:     The following orders were created for panel order Troponin I Series, High Sensitivity (0, 1 HR).  Procedure                               Abnormality         Status                     ---------                               -----------         ------                     Troponin I, High Sensiti...[[id number]]  Abnormal            Final result               Troponin, High Sensitivi...[[id number]]  Abnormal            Final result                 Please view results for these tests on the  individual orders.   TYPE AND SCREEN    ABO TYPE O      Rh TYPE NEG      ANTIBODY SCREEN NEG     SODIUM, URINE RANDOM   ALBUMIN-CREATININE RATIO, URINE RANDOM   URINALYSIS WITH REFLEX MICROSCOPIC   CBC   BASIC METABOLIC PANEL         ED Course & MDM   ED Course as of 05/28/25 2116   Wed May 28, 2025   2115 ECG 12 Lead  Twelve-lead EKG interpreted by me.  Heart rate of 38.  Complete heart block.  No acute ischemia or injury pattern noted. [CT]      ED Course User Index  [CT] Hilarynataliya Baumann, APRN-CNP         Diagnoses as of 05/28/25 2116   Complete heart block   Anemia, unspecified type           Medical Decision Making  Patient was seen and evaluated with the attending physician, .  Patient is presenting to the emergency room with complaints of shortness of breath and bradycardia.  Differential diagnosis includes ACS, heart failure, tamponade, arrhythmia, GI bleed, or other acute process.  The patient was placed on cardiac monitor and continuous pulse oximetry.  On arrival to the emergency room the patient's heart rate was 38.  A twelve-lead EKG was obtained.  Pacer pads were placed on the patient and the crash cart was at bedside.  EKG was obtained and reviewed.  It was hard to tell if the patient was in Mob[place] 2 versus complete heart block.  The EKG was sent to cardiology for review by Dr. Aguirre.  She was at bedside for evaluation.  Repeat EKGs were obtained to gain further clarification of the patient's heart rhythm.  The EKG was shared with the electrophysiologist and a plan was devised for the patient to be admitted to the ICU with pacemaker placement for tomorrow at 1330.  The patient's blood pressure remained stable throughout the ED course.  Initial troponin was 47 with a delta troponin of 49.  BNP was mildly elevated at 493.  The patient's creatinine was elevated at 4.87 and a BUN of 60.  The patient does not have any acute leukocytosis.  Hemoglobin was 8.4.  His last hemoglobin 9 months ago was  10.3.  The patient was typed and screened.  IMS was consulted regarding management of the patient and he is to be admitted to the ICU under Dr. Burdick.           Your medication list        CONTINUE taking these medications        Instructions Last Dose Given Next Dose Due   FreeStyle Tal 2 Sensor kit  Generic drug: flash glucose sensor kit                  ASK your doctor about these medications        Instructions Last Dose Given Next Dose Due   allopurinol 100 mg tablet  Commonly known as: Zyloprim           amLODIPine 10 mg tablet  Commonly known as: Norvasc           ascorbic acid 500 mg chewable tablet  Commonly known as: Vitamin C           cholecalciferol 50 mcg (2,000 units) tablet  Commonly known as: Vitamin D-3           Eliquis 2.5 mg tablet  Generic drug: apixaban           famotidine 40 mg tablet  Commonly known as: Pepcid           ferrous sulfate 325 mg (65 mg elemental) tablet           Januvia 25 mg tablet  Generic drug: SITagliptin phosphate           pantoprazole 40 mg EC tablet  Commonly known as: ProtoNix           Retacrit 40,000 unit/mL injection  Generic drug: epoetin phi-epbx           thiamine 50 mg tablet  Commonly known as: Vitamin B-1           VITAMIN A ORAL                    Procedure       *This report was transcribed using voice recognition software.  Every effort was made to ensure accuracy; however, inadvertent computerized transcription errors may be present.*  Hilary Baumann APRRIIS-CNP  05/28/25           Hilary Baumann, GURPREET-CNP  05/28/25 1302       [1]   Past Medical History:  Diagnosis Date    Anemia     Atrial fibrillation (Multi)     Chronic kidney disease     CKD (chronic kidney disease), stage IV (Multi)     Colon cancer (Multi)     Diabetes mellitus (Multi)     GERD (gastroesophageal reflux disease)     Hypertension     Prostate cancer (Multi)     Skin cancer    [2]   Past Surgical History:  Procedure Laterality Date    CATARACT EXTRACTION      CHOLECYSTECTOMY       COLONOSCOPY      CT ABDOMEN PELVIS ANGIOGRAM W AND/OR WO IV CONTRAST  04/06/2022    CT ABDOMEN PELVIS ANGIOGRAM W AND/OR WO IV CONTRAST 4/6/2022 [facility]    OTHER SURGICAL HISTORY  02/21/2022    Cataract surgery    OTHER SURGICAL HISTORY  02/21/2022    Blepharoplasty    TONSILLECTOMY      UPPER GASTROINTESTINAL ENDOSCOPY     [3]   Family History  Problem Relation Name Age of Onset    Colon cancer Father     [4]   Social History  Tobacco Use    Smoking status: Former     Types: Cigarettes    Smokeless tobacco: Never   Vaping Use    Vaping status: Never Used   Substance Use Topics    Alcohol use: Not Currently    Drug use: Never        GURPREET Pittman-CNP  05/28/25 2154

## 2025-05-28 NOTE — CONSULTS
"Reason For Consult      CKD     History Of Present Illness  Jacob Massey \"Doug\" is a 85 y.o. male presenting with past medical history of CKD 4, diverticulitis, proteinuria, diabetes type 2, hypertension, A-fib, GERD and a history of colon cancer.  Patient presents to [facility] with bradycardia.  Patient also complains of shortness of breath and fatigue patient had a recent blood transfusion a few weeks ago.     Nephrology consulted for CKD 4.  Sees Dr. Tomlin outpatient.  Patient has been CKD 4 with slow progression likely will be CKD 5 plans for PD initiation when the time comes.  Serum creatinine fluctuating and was 4.4 on 5/8/2025.  Was recently taken off carvedilol in April for bradycardia.  Denies any NSAID use.  Continues to make good urine output per patient.  History of diabetes well control.  Checks blood pressures at home systolic blood pressures have been ranging 130s to 140s.      Past Medical History  He has a past medical history of Anemia, Atrial fibrillation (Multi), Chronic kidney disease, CKD (chronic kidney disease), stage IV (Multi), Colon cancer (Multi), Diabetes mellitus (Multi), GERD (gastroesophageal reflux disease), Hypertension, Prostate cancer (Multi), and Skin cancer.    Surgical History  He has a past surgical history that includes Other surgical history (02/21/2022); Other surgical history (02/21/2022); CT angio abdomen pelvis w and or wo IV IV contrast (04/06/2022); Cataract extraction; Tonsillectomy; Cholecystectomy; Colonoscopy; and Upper gastrointestinal endoscopy.     Social History  He reports that he has quit smoking. His smoking use included cigarettes. He has never used smokeless tobacco. He reports that he does not currently use alcohol. He reports that he does not use drugs.    Family History  Family History[1]   .Current Medications[2]   Allergies  Tetracycline, Adhesive tape-silicones, Penicillin, Penicillin v potassium, and Tetracycline hcl    Review of " Systems  .Review of Systems   Constitutional:  Positive for activity change and fatigue. Negative for appetite change and fever.   HENT:  Negative for dental problem and ear discharge.    Respiratory:  Negative for choking, shortness of breath and wheezing.    Cardiovascular:  Negative for chest pain, palpitations and leg swelling.   Gastrointestinal:  Positive for diarrhea. Negative for abdominal distention, abdominal pain, nausea and vomiting.   Endocrine: Negative for polyphagia and polyuria.   Genitourinary:  Negative for decreased urine volume and urgency.   Musculoskeletal:  Negative for gait problem and neck pain.   Skin:  Negative for color change and pallor.   Allergic/Immunologic: Negative for immunocompromised state.   Neurological:  Positive for dizziness. Negative for seizures, facial asymmetry and speech difficulty.   Psychiatric/Behavioral:  Negative for agitation and behavioral problems.          Physical Exam  .Physical Exam  Constitutional:       Appearance: He is ill-appearing.   HENT:      Mouth/Throat:      Mouth: Mucous membranes are moist.   Eyes:      Extraocular Movements: Extraocular movements intact.      Pupils: Pupils are equal, round, and reactive to light.   Cardiovascular:      Rate and Rhythm: Bradycardia present.      Pulses: Normal pulses.      Heart sounds: Normal heart sounds.      Comments: Complete heart block   Pulmonary:      Effort: Pulmonary effort is normal.      Breath sounds: Normal breath sounds.   Abdominal:      General: Abdomen is flat. Bowel sounds are normal.      Palpations: Abdomen is soft.   Musculoskeletal:      Right lower leg: No edema.      Left lower leg: No edema.   Skin:     General: Skin is warm and dry.   Neurological:      Mental Status: He is alert and oriented to person, place, and time. Mental status is at baseline.   Psychiatric:         Mood and Affect: Mood normal.         Behavior: Behavior normal.                I&O 24HR  No intake or output  "data in the 24 hours ending 05/28/25 1352    Vitals 24HR  Heart Rate:  [32-38]   Temperature:  [36.9 °C (98.4 °F)]   Respirations:  [14-21]   BP: (136-154)/(30-88)   Height:  [177.8 cm (5' 10\")]   Weight:  [83.9 kg (185 lb)]   Pulse Ox:  [93 %-98 %]     .  Vitals:    05/28/25 1110 05/28/25 1130 05/28/25 1200 05/28/25 1330   BP: 154/56 137/88 141/53 144/67   Pulse: (!) 34 (!) 33 (!) 32 (!) 33   Resp: (!) 21 20 (!) 21 13   Temp:       SpO2: 94% 94% 95% 94%   Weight:       Height:            Relevant Results  .  Results from last 7 days   Lab Units 05/28/25  1047   SODIUM mmol/L 139   POTASSIUM mmol/L 4.4   CHLORIDE mmol/L 110*   CO2 mmol/L 16*   BUN mg/dL 60*   CREATININE mg/dL 4.87*   EGFR mL/min/1.73m*2 11*   GLUCOSE mg/dL 150*   CALCIUM mg/dL 8.9   PHOSPHORUS mg/dL 4.0    .  Results from last 7 days   Lab Units 05/28/25  1047   WBC AUTO x10*3/uL 6.0   HEMOGLOBIN g/dL 8.4*   HEMATOCRIT % 27.2*   PLATELETS AUTO x10*3/uL 223           GISSELLE/ATN vs CKD progression  2/2 volume depletion hemodynamic fluctuations  N17.0  Chronic metabolic acidosis E87.22  Anemia D64.9   Complete heart block I44.2  Bradycardia  Elevated troponin   CKD 4/5 N18.4  Type 2 diabetes  A-fib    Recommendations  Serum creatinine up at 4.85 previously 4.4 5/6/25  GFR fluctuation 11-22%  Avoid all nephrotoxic agent and contrast   Urine studies and UA ordered  We will also check a renal ultrasound  At very high risk of needing dialysis, plans for PD, which was discussed with outpatient nephrology doctor Dr. Tomlin, when the time comes  avoid blood pressure fluctuations to help with renal perfusion  Chronic metabolic acidosis continue with sodium bicarb tablets  Receiving Epogen outpatient, okay for blood transfusions for hemoglobin less than 7, recent blood transfusions 2 units  Continue with Norvasc 10 mg daily, keep off beta-blockers given heart block  Will defer bradycardia to cardiology team  Rest of management per ICU team       Assessment & " Plan  Complete heart block          JIA Joe         [1]   Family History  Problem Relation Name Age of Onset    Colon cancer Father     [2]   Current Facility-Administered Medications:     allopurinol (Zyloprim) tablet 100 mg, 100 mg, oral, Daily, JIA Farris    [START ON 5/29/2025] amLODIPine (Norvasc) tablet 10 mg, 10 mg, oral, Daily, JIA Farris    [Held by provider] apixaban (Eliquis) tablet 2.5 mg, 2.5 mg, oral, BID, JIA Farris    bisacodyl (Dulcolax) EC tablet 10 mg, 10 mg, oral, Daily PRN, JIA Farris    cholecalciferol (Vitamin D-3) tablet 50 mcg, 50 mcg, oral, Daily, JIA Farris    dextrose 50 % injection 12.5 g, 12.5 g, intravenous, q15 min PRN, JIA Farris    dextrose 50 % injection 25 g, 25 g, intravenous, q15 min PRN, JIA Farris    glucagon (Glucagen) injection 1 mg, 1 mg, intramuscular, q15 min PRN, JIA Farris    glucagon (Glucagen) injection 1 mg, 1 mg, intramuscular, q15 min PRN, JIA Farris    guaiFENesin (Mucinex) 12 hr tablet 600 mg, 600 mg, oral, q12h PRN, JIA Farris    heparin (porcine) injection 5,000 Units, 5,000 Units, subcutaneous, q8h, JIA Farris    insulin lispro injection 0-10 Units, 0-10 Units, subcutaneous, TID AC, JIA Farris    melatonin tablet 3 mg, 3 mg, oral, Nightly PRN, JIA Farris    ondansetron (Zofran) tablet 4 mg, 4 mg, oral, q8h PRN **OR** ondansetron (Zofran) injection 4 mg, 4 mg, intravenous, q8h PRN, JIA Farris    [START ON 5/29/2025] pantoprazole (ProtoNix) EC tablet 40 mg, 40 mg, oral, Daily before breakfast **OR** [START ON 5/29/2025] pantoprazole (Protonix) injection 40 mg, 40 mg, intravenous, Daily before breakfast, Earnest Moore, APRN-CNP    polyethylene glycol (Glycolax, Miralax) packet 17 g, 17 g, oral, Daily, Earnest  ED Moore, APRN-CNP

## 2025-05-29 ENCOUNTER — APPOINTMENT (OUTPATIENT)
Dept: CARDIOLOGY | Facility: HOSPITAL | Age: 85
End: 2025-05-29
Payer: MEDICARE

## 2025-05-29 LAB
ANION GAP SERPL CALC-SCNC: 19 MMOL/L (ref 10–20)
APPEARANCE UR: CLEAR
BILIRUB UR STRIP.AUTO-MCNC: NEGATIVE MG/DL
BUN SERPL-MCNC: 64 MG/DL (ref 6–23)
CALCIUM SERPL-MCNC: 8.6 MG/DL (ref 8.6–10.3)
CHLORIDE SERPL-SCNC: 109 MMOL/L (ref 98–107)
CO2 SERPL-SCNC: 16 MMOL/L (ref 21–32)
COLOR UR: YELLOW
CREAT SERPL-MCNC: 4.96 MG/DL (ref 0.5–1.3)
CREAT UR-MCNC: 209.4 MG/DL (ref 20–370)
CREAT UR-MCNC: 209.4 MG/DL (ref 20–370)
EGFRCR SERPLBLD CKD-EPI 2021: 11 ML/MIN/1.73M*2
ERYTHROCYTE [DISTWIDTH] IN BLOOD BY AUTOMATED COUNT: 17.3 % (ref 11.5–14.5)
FERRITIN SERPL-MCNC: 33 NG/ML (ref 20–300)
GLUCOSE BLD MANUAL STRIP-MCNC: 107 MG/DL (ref 74–99)
GLUCOSE BLD MANUAL STRIP-MCNC: 117 MG/DL (ref 74–99)
GLUCOSE BLD MANUAL STRIP-MCNC: 121 MG/DL (ref 74–99)
GLUCOSE BLD MANUAL STRIP-MCNC: 123 MG/DL (ref 74–99)
GLUCOSE SERPL-MCNC: 113 MG/DL (ref 74–99)
GLUCOSE UR STRIP.AUTO-MCNC: NORMAL MG/DL
HCT VFR BLD AUTO: 24.4 % (ref 41–52)
HGB BLD-MCNC: 7.5 G/DL (ref 13.5–17.5)
HYALINE CASTS #/AREA URNS AUTO: ABNORMAL /LPF
IRON SATN MFR SERPL: 10 % (ref 25–45)
IRON SERPL-MCNC: 32 UG/DL (ref 35–150)
KETONES UR STRIP.AUTO-MCNC: NEGATIVE MG/DL
LEUKOCYTE ESTERASE UR QL STRIP.AUTO: NEGATIVE
MCH RBC QN AUTO: 30.6 PG (ref 26–34)
MCHC RBC AUTO-ENTMCNC: 30.7 G/DL (ref 32–36)
MCV RBC AUTO: 100 FL (ref 80–100)
MICROALBUMIN UR-MCNC: 180.5 MG/L
MICROALBUMIN/CREAT UR: 86.2 UG/MG CREAT
MUCOUS THREADS #/AREA URNS AUTO: ABNORMAL /LPF
NITRITE UR QL STRIP.AUTO: NEGATIVE
NRBC BLD-RTO: 0 /100 WBCS (ref 0–0)
PH UR STRIP.AUTO: 5 [PH]
PLATELET # BLD AUTO: 208 X10*3/UL (ref 150–450)
POTASSIUM SERPL-SCNC: 4.7 MMOL/L (ref 3.5–5.3)
PROT UR STRIP.AUTO-MCNC: NORMAL MG/DL
RBC # BLD AUTO: 2.45 X10*6/UL (ref 4.5–5.9)
RBC # UR STRIP.AUTO: NEGATIVE MG/DL
RBC #/AREA URNS AUTO: ABNORMAL /HPF
SODIUM SERPL-SCNC: 139 MMOL/L (ref 136–145)
SODIUM UR-SCNC: 18 MMOL/L
SODIUM/CREAT UR-RTO: 9 MMOL/G CREAT
SP GR UR STRIP.AUTO: 1.01
TIBC SERPL-MCNC: 313 UG/DL (ref 240–445)
UIBC SERPL-MCNC: 281 UG/DL (ref 110–370)
UROBILINOGEN UR STRIP.AUTO-MCNC: NORMAL MG/DL
WBC # BLD AUTO: 5.6 X10*3/UL (ref 4.4–11.3)
WBC #/AREA URNS AUTO: ABNORMAL /HPF

## 2025-05-29 PROCEDURE — 84300 ASSAY OF URINE SODIUM: CPT | Performed by: REGISTERED NURSE

## 2025-05-29 PROCEDURE — 0JH606Z INSERTION OF PACEMAKER, DUAL CHAMBER INTO CHEST SUBCUTANEOUS TISSUE AND FASCIA, OPEN APPROACH: ICD-10-PCS | Performed by: INTERNAL MEDICINE

## 2025-05-29 PROCEDURE — 2500000004 HC RX 250 GENERAL PHARMACY W/ HCPCS (ALT 636 FOR OP/ED): Mod: JZ | Performed by: INTERNAL MEDICINE

## 2025-05-29 PROCEDURE — 2500000001 HC RX 250 WO HCPCS SELF ADMINISTERED DRUGS (ALT 637 FOR MEDICARE OP): Performed by: NURSE PRACTITIONER

## 2025-05-29 PROCEDURE — C1785 PMKR, DUAL, RATE-RESP: HCPCS | Performed by: INTERNAL MEDICINE

## 2025-05-29 PROCEDURE — 83540 ASSAY OF IRON: CPT | Performed by: REGISTERED NURSE

## 2025-05-29 PROCEDURE — 2500000004 HC RX 250 GENERAL PHARMACY W/ HCPCS (ALT 636 FOR OP/ED): Performed by: REGISTERED NURSE

## 2025-05-29 PROCEDURE — 2720000007 HC OR 272 NO HCPCS: Performed by: INTERNAL MEDICINE

## 2025-05-29 PROCEDURE — 99233 SBSQ HOSP IP/OBS HIGH 50: CPT | Performed by: INTERNAL MEDICINE

## 2025-05-29 PROCEDURE — C1898 LEAD, PMKR, OTHER THAN TRANS: HCPCS | Performed by: INTERNAL MEDICINE

## 2025-05-29 PROCEDURE — C1892 INTRO/SHEATH,FIXED,PEEL-AWAY: HCPCS | Performed by: INTERNAL MEDICINE

## 2025-05-29 PROCEDURE — 82728 ASSAY OF FERRITIN: CPT | Performed by: REGISTERED NURSE

## 2025-05-29 PROCEDURE — 2780000003 HC OR 278 NO HCPCS: Performed by: INTERNAL MEDICINE

## 2025-05-29 PROCEDURE — C1887 CATHETER, GUIDING: HCPCS | Performed by: INTERNAL MEDICINE

## 2025-05-29 PROCEDURE — 33208 INSRT HEART PM ATRIAL & VENT: CPT | Performed by: INTERNAL MEDICINE

## 2025-05-29 PROCEDURE — 81001 URINALYSIS AUTO W/SCOPE: CPT | Performed by: REGISTERED NURSE

## 2025-05-29 PROCEDURE — 36415 COLL VENOUS BLD VENIPUNCTURE: CPT | Performed by: NURSE PRACTITIONER

## 2025-05-29 PROCEDURE — 82947 ASSAY GLUCOSE BLOOD QUANT: CPT

## 2025-05-29 PROCEDURE — 80048 BASIC METABOLIC PNL TOTAL CA: CPT | Performed by: NURSE PRACTITIONER

## 2025-05-29 PROCEDURE — 93005 ELECTROCARDIOGRAM TRACING: CPT

## 2025-05-29 PROCEDURE — 02HK3JZ INSERTION OF PACEMAKER LEAD INTO RIGHT VENTRICLE, PERCUTANEOUS APPROACH: ICD-10-PCS | Performed by: INTERNAL MEDICINE

## 2025-05-29 PROCEDURE — 99153 MOD SED SAME PHYS/QHP EA: CPT | Performed by: INTERNAL MEDICINE

## 2025-05-29 PROCEDURE — 2020000001 HC ICU ROOM DAILY

## 2025-05-29 PROCEDURE — 2750000001 HC OR 275 NO HCPCS: Performed by: INTERNAL MEDICINE

## 2025-05-29 PROCEDURE — 99152 MOD SED SAME PHYS/QHP 5/>YRS: CPT | Performed by: INTERNAL MEDICINE

## 2025-05-29 PROCEDURE — 85027 COMPLETE CBC AUTOMATED: CPT | Performed by: NURSE PRACTITIONER

## 2025-05-29 PROCEDURE — 02H63JZ INSERTION OF PACEMAKER LEAD INTO RIGHT ATRIUM, PERCUTANEOUS APPROACH: ICD-10-PCS | Performed by: INTERNAL MEDICINE

## 2025-05-29 PROCEDURE — 82570 ASSAY OF URINE CREATININE: CPT | Performed by: REGISTERED NURSE

## 2025-05-29 PROCEDURE — 2500000004 HC RX 250 GENERAL PHARMACY W/ HCPCS (ALT 636 FOR OP/ED): Performed by: NURSE PRACTITIONER

## 2025-05-29 PROCEDURE — 2500000001 HC RX 250 WO HCPCS SELF ADMINISTERED DRUGS (ALT 637 FOR MEDICARE OP): Performed by: REGISTERED NURSE

## 2025-05-29 DEVICE — LEAD 5076-52 CAPSUREFIX NOVUS US EN
Type: IMPLANTABLE DEVICE | Site: CHEST | Status: FUNCTIONAL
Brand: CAPSUREFIX® NOVUS

## 2025-05-29 DEVICE — IPG W1DR01 AZURE XT DR MRI WL USA BCP
Type: IMPLANTABLE DEVICE | Site: CHEST | Status: FUNCTIONAL
Brand: AZURE™ XT DR MRI SURESCAN™

## 2025-05-29 DEVICE — LEAD 383069 SELECT SECURE US EN FPA
Type: IMPLANTABLE DEVICE | Site: CHEST | Status: FUNCTIONAL
Brand: SELECTSECURE™

## 2025-05-29 RX ORDER — FENTANYL CITRATE 50 UG/ML
INJECTION, SOLUTION INTRAMUSCULAR; INTRAVENOUS AS NEEDED
Status: DISCONTINUED | OUTPATIENT
Start: 2025-05-29 | End: 2025-05-29 | Stop reason: HOSPADM

## 2025-05-29 RX ORDER — SODIUM BICARBONATE 325 MG/1
1950 TABLET ORAL 3 TIMES DAILY
Status: DISCONTINUED | OUTPATIENT
Start: 2025-05-29 | End: 2025-05-30 | Stop reason: HOSPADM

## 2025-05-29 RX ORDER — LIDOCAINE HYDROCHLORIDE 20 MG/ML
INJECTION, SOLUTION INFILTRATION; PERINEURAL AS NEEDED
Status: DISCONTINUED | OUTPATIENT
Start: 2025-05-29 | End: 2025-05-29 | Stop reason: HOSPADM

## 2025-05-29 RX ORDER — SODIUM CHLORIDE, SODIUM LACTATE, POTASSIUM CHLORIDE, CALCIUM CHLORIDE 600; 310; 30; 20 MG/100ML; MG/100ML; MG/100ML; MG/100ML
50 INJECTION, SOLUTION INTRAVENOUS CONTINUOUS
Status: ACTIVE | OUTPATIENT
Start: 2025-05-29 | End: 2025-05-30

## 2025-05-29 RX ORDER — VANCOMYCIN HYDROCHLORIDE 1 G/200ML
1.5 INJECTION, SOLUTION INTRAVENOUS ONCE
Status: DISCONTINUED | OUTPATIENT
Start: 2025-05-29 | End: 2025-05-29

## 2025-05-29 RX ORDER — MIDAZOLAM HYDROCHLORIDE 1 MG/ML
INJECTION, SOLUTION INTRAMUSCULAR; INTRAVENOUS AS NEEDED
Status: DISCONTINUED | OUTPATIENT
Start: 2025-05-29 | End: 2025-05-29 | Stop reason: HOSPADM

## 2025-05-29 RX ORDER — VANCOMYCIN HYDROCHLORIDE 1.5 G/300ML
1500 INJECTION, SOLUTION INTRAVITREAL ONCE
Status: COMPLETED | OUTPATIENT
Start: 2025-05-29 | End: 2025-05-29

## 2025-05-29 RX ADMIN — ALLOPURINOL 100 MG: 100 TABLET ORAL at 08:03

## 2025-05-29 RX ADMIN — AMLODIPINE BESYLATE 10 MG: 10 TABLET ORAL at 06:14

## 2025-05-29 RX ADMIN — FERROUS SULFATE TAB 325 MG (65 MG ELEMENTAL FE) 1 TABLET: 325 (65 FE) TAB at 08:02

## 2025-05-29 RX ADMIN — OXYCODONE HYDROCHLORIDE AND ACETAMINOPHEN 500 MG: 500 TABLET ORAL at 08:03

## 2025-05-29 RX ADMIN — HEPARIN SODIUM 5000 UNITS: 5000 INJECTION, SOLUTION INTRAVENOUS; SUBCUTANEOUS at 20:39

## 2025-05-29 RX ADMIN — SODIUM BICARBONATE 1950 MG: 325 TABLET ORAL at 17:06

## 2025-05-29 RX ADMIN — SODIUM CHLORIDE, POTASSIUM CHLORIDE, SODIUM LACTATE AND CALCIUM CHLORIDE 50 ML/HR: 600; 310; 30; 20 INJECTION, SOLUTION INTRAVENOUS at 10:04

## 2025-05-29 RX ADMIN — VANCOMYCIN HYDROCHLORIDE 1.5 G: 1.5 INJECTION, SOLUTION INTRAVITREAL at 14:10

## 2025-05-29 RX ADMIN — IRON SUCROSE 300 MG: 20 INJECTION, SOLUTION INTRAVENOUS at 17:06

## 2025-05-29 RX ADMIN — THIAMINE HCL TAB 100 MG 50 MG: 100 TAB at 08:03

## 2025-05-29 RX ADMIN — SODIUM BICARBONATE 1300 MG: 325 TABLET ORAL at 08:03

## 2025-05-29 RX ADMIN — PANTOPRAZOLE SODIUM 40 MG: 40 TABLET, DELAYED RELEASE ORAL at 06:14

## 2025-05-29 RX ADMIN — Medication 50 MCG: at 08:03

## 2025-05-29 ASSESSMENT — COGNITIVE AND FUNCTIONAL STATUS - GENERAL
MOVING FROM LYING ON BACK TO SITTING ON SIDE OF FLAT BED WITH BEDRAILS: A LITTLE
STANDING UP FROM CHAIR USING ARMS: A LITTLE
CLIMB 3 TO 5 STEPS WITH RAILING: A LITTLE
MOBILITY SCORE: 22
WALKING IN HOSPITAL ROOM: A LITTLE
TURNING FROM BACK TO SIDE WHILE IN FLAT BAD: A LITTLE
DRESSING REGULAR UPPER BODY CLOTHING: A LITTLE
CLIMB 3 TO 5 STEPS WITH RAILING: A LITTLE
DAILY ACTIVITIY SCORE: 24
DRESSING REGULAR LOWER BODY CLOTHING: A LITTLE
TOILETING: A LITTLE
WALKING IN HOSPITAL ROOM: A LITTLE
DAILY ACTIVITIY SCORE: 20
MOBILITY SCORE: 18
MOVING TO AND FROM BED TO CHAIR: A LITTLE
HELP NEEDED FOR BATHING: A LITTLE

## 2025-05-29 ASSESSMENT — PAIN SCALES - GENERAL
PAINLEVEL_OUTOF10: 0 - NO PAIN

## 2025-05-29 ASSESSMENT — PAIN - FUNCTIONAL ASSESSMENT
PAIN_FUNCTIONAL_ASSESSMENT: 0-10

## 2025-05-29 NOTE — PROGRESS NOTES
Social work consult placed for discharge planning. SW reviewed pt's chart and communicated with TCC. No SW needs foreseen at this time. SW signing off; available upon request.    Campbell Nicole, MSW, LSW ([id number])   Care Transitions

## 2025-05-29 NOTE — PROGRESS NOTES
"Jacob Massey \"Doug\" is a 85 y.o. male on day 1 of admission presenting with Complete heart block.      Subjective   85 y.o. male with PMHx s/f  kidney disease followed by [facility], atrial fibrillation, type 2 diabetes, CKD stage IV, GERD, Akers's esophagus, prior history of colon cancer, chronic anemia with periodic transfusions and erythropoietin injections presenting with symptomatic bradycardia.  Heart rate on admission was in the high 30s and patient was feeling lightheadedness/shortness of breath.  He was admitted to ICU with a diagnosis of complete heart block and on pacemaker pads.  EP cardiology consulted-plan for PPM placement this afternoon    5/29/2025 patient was evaluated this morning, no dizziness or lightheadedness while she is laying in the bed, denies having chest pain.  Telemetry monitoring shows complete heart block with heart rate of low 30s       Objective     Last Recorded Vitals  /53   Pulse (!) 32   Temp 36.6 °C (97.8 °F) (Temporal)   Resp 15   Wt 84 kg (185 lb 3 oz)   SpO2 94%   Intake/Output last 3 Shifts:    Intake/Output Summary (Last 24 hours) at 5/29/2025 0713  Last data filed at 5/29/2025 0300  Gross per 24 hour   Intake --   Output 175 ml   Net -175 ml       Admission Weight  Weight: 83.9 kg (185 lb) (05/28/25 1027)    Daily Weight  05/28/25 : 84 kg (185 lb 3 oz)    Image Results  ECG 12 lead  Complete AV block with wide QRS complex  Right bundle branch block    See ED provider note for full interpretation and clinical correlation  Confirmed by Renuka Giraldo ([id number]) on 5/28/2025 2:12:50 PM  Electrocardiogram, 12-lead PRN ACS symptoms  Sinus tachycardia with 2nd degree AV block with 3:1 AV conduction  Right bundle branch block  Inferior infarct , age undetermined  Abnormal ECG  When compared with ECG of 28-MAY-2025 11:08, (unconfirmed)  No significant change was found  See ED provider note for full interpretation and clinical correlation  Confirmed by " Renuka Giraldo ([id number]) on 5/28/2025 2:11:32 PM  XR chest 1 view  Narrative: STUDY:  Chest Radiograph;  5/28/2025 11:38 AM  INDICATION:  Bradycardia.  COMPARISON:  2/9/2019 XR Chest.  ACCESSION NUMBER(S):  [id number]  ORDERING CLINICIAN:  LUCY AQUINO  TECHNIQUE:  Frontal chest was obtained at 1137 hours.  FINDINGS:  CARDIOMEDIASTINAL SILHOUETTE:  Heart size is enlarged and mediastinal contours appear stable.  There  is pacer pad overlying the right chest and left lower hemithorax.     LUNGS:  There is pulmonary vascular congestion and cephalization.  There are  mild basilar opacities greater in the right lower lobe.  There is no  pneumothorax or significant pleural effusion.     ABDOMEN:  No remarkable upper abdominal findings.     BONES:  No acute osseous changes.  Impression: Cardiomegaly and pulmonary vascular congestion.  Please correlate for  component of fluid overload.  Mild basilar opacities.  Early pneumonia  is not excluded.  Signed by Hernando Buenrostro DO      Physical Exam  Patient is awake and orient, not in apparent distress  Eyes: PERRLA, no conjunctival congestion  Chest: Bilateral Air entry, no crackles or wheezing  Heart: s1S2 regular, no murmur  Abdomen: Soft, non tender, BS present  Ext:    Relevant Results               This patient currently has cardiac telemetry ordered; if you would like to modify or discontinue the telemetry order, click here to go to the orders activity to modify/discontinue the order.              Assessment & Plan    Complete heart block  Beta-blockers discontinued  Continue on telemonitoring in ICU  EP cardiology consult-plan for pacemaker placement this afternoon    Paroxysmal A-fib  Patient presented with complete heart block, beta-blocker discontinued  Eliquis on hold for anticipated procedure  Continue telemonitoring    GISSELLE on CKD stage IV  Monitor renal function  Follow renal ultrasound to rule out obstruction  Nephrology consulted    Chronic metabolic  acidosis  On sodium bicarb tablets    Non-MI elevation of troponin  Secondary to CHB/CKD  Monitor    Anemia of chronic disease  No apparent bleeding  Monitor H&H and transfuse as needed to maintain hemoglobin of 7 or higher  Patient receives periodic PRBC transfusion and erythropoietin injection     Type II DM  Continue on basal and bolus insulin with sliding scale coverage      Dennise Recinos MD

## 2025-05-29 NOTE — PROGRESS NOTES
"Physical Therapy                 Therapy Communication Note    Patient Name: Jacob Massey \"Doug\"  MRN: [id number]  Department: [facility]  Room: 02/02-A  Today's Date: 5/29/2025     Discipline: Physical Therapy    Missed Visit: PT Missed Visit: Yes     Missed Visit Reason: Missed Visit Reason: Patient placed on medical hold (Patient HR low this morning. Has pacemaker procedure today. Per nurse, patient SOB with any mobility. Will see patient post procedure for PT eval.)    Missed Time: Attempt    Comment:    Completion of this session, clinical decision making, and documentation performed under the supervision/direction of Ceci Clayton.    TRAVIS GARDNERPT      "

## 2025-05-29 NOTE — PROGRESS NOTES
"Occupational Therapy                 Therapy Communication Note    Patient Name: Jacob Massey \"Doug\"  MRN: [id number]  Department: [facility]  Room: 02/02-A  Today's Date: 5/29/2025     Discipline: Occupational Therapy    Missed Visit Reason: Missed Visit Reason: Patient in a medical procedure (Per nursing, pt with decreased HR and SOB with any mobility. Pt pending pacemaker placement procedure this date. Hold OT evaluation this date.)    Missed Time: Attempt          "

## 2025-05-29 NOTE — CARE PLAN
Problem: Pain - Adult  Goal: Verbalizes/displays adequate comfort level or baseline comfort level  5/28/2025 2201 by Osmar Becerril RN  Outcome: Progressing  5/28/2025 2200 by Osmar Becerril RN  Outcome: Progressing     Problem: Safety - Adult  Goal: Free from fall injury  5/28/2025 2201 by Osmar Becerril RN  Outcome: Progressing  5/28/2025 2200 by Osmar Becerril RN  Outcome: Progressing     Problem: Discharge Planning  Goal: Discharge to home or other facility with appropriate resources  5/28/2025 2201 by Osmar Becerril RN  Outcome: Progressing  5/28/2025 2200 by Osmar Becerril RN  Outcome: Progressing     Problem: Chronic Conditions and Co-morbidities  Goal: Patient's chronic conditions and co-morbidity symptoms are monitored and maintained or improved  5/28/2025 2201 by Osmar Becerril RN  Outcome: Progressing  5/28/2025 2200 by Osmar Becerril RN  Outcome: Progressing     Problem: Nutrition  Goal: Nutrient intake appropriate for maintaining nutritional needs  5/28/2025 2201 by Osmar Becerril RN  Outcome: Progressing  5/28/2025 2200 by Osmar Becerril RN  Outcome: Progressing     Problem: Diabetes  Goal: Achieve decreasing blood glucose levels by end of shift  5/28/2025 2201 by Osmar Becerril RN  Outcome: Progressing  5/28/2025 2200 by Osmar Becerril RN  Outcome: Progressing  Goal: Increase stability of blood glucose readings by end of shift  5/28/2025 2201 by Osmar Becerril RN  Outcome: Progressing  5/28/2025 2200 by Osmar Becerril RN  Outcome: Progressing  Goal: Decrease in ketones present in urine by end of shift  5/28/2025 2201 by Osmar Becerril RN  Outcome: Progressing  5/28/2025 2200 by Osmar Becerril RN  Outcome: Progressing  Goal: Maintain electrolyte levels within acceptable range throughout shift  5/28/2025 2201 by Osmar Becerril RN  Outcome: Progressing  5/28/2025 2200 by Osmar Becerril RN  Outcome: Progressing  Goal: Maintain glucose levels >70mg/dl to <250mg/dl throughout shift  5/28/2025 2201 by Osmar Becerril,  RN  Outcome: Progressing  5/28/2025 2200 by Osmar Becerril RN  Outcome: Progressing  Goal: No changes in neurological exam by end of shift  5/28/2025 2201 by Osmar Becerril RN  Outcome: Progressing  5/28/2025 2200 by Osmar Becerril RN  Outcome: Progressing  Goal: Learn about and adhere to nutrition recommendations by end of shift  5/28/2025 2201 by Osmar Becerril RN  Outcome: Progressing  5/28/2025 2200 by Osmar Becerril RN  Outcome: Progressing  Goal: Vital signs within normal range for age by end of shift  5/28/2025 2201 by Osmar Becerril RN  Outcome: Progressing  5/28/2025 2200 by Osmar Becerril RN  Outcome: Progressing  Goal: Increase self care and/or family involovement by end of shift  5/28/2025 2201 by Osmar Becerril RN  Outcome: Progressing  5/28/2025 2200 by Osmar Becerril RN  Outcome: Progressing  Goal: Receive DSME education by end of shift  5/28/2025 2201 by Osmar Becerril RN  Outcome: Progressing  5/28/2025 2200 by Osmar Becerril RN  Outcome: Progressing     Problem: Arrythmia/Dysrhythmia  Goal: Verbalize understanding of procedures/devices  Outcome: Progressing  Goal: Vital signs return to baseline  Outcome: Progressing       The clinical goals for the shift include pt to remain hemodynamically stable throughout the shift

## 2025-05-29 NOTE — PRE-SEDATION DOCUMENTATION
Sedation Plan    ASA 3     Mallampati class: I.    Risks, benefits, and alternatives discussed with patient.

## 2025-05-29 NOTE — PROGRESS NOTES
"Jacob Massey \"Mendoza" is a 85 y.o. male on day 1 of admission presenting with Complete heart block.      Subjective   .Interval History    Patient is feeling okay  On room air  Family at bedside  Blood pressure remains low asymptomatic  Having urine output  N.p.o. for pacemaker  ROS  Denies chest pain, shortness of breath,  Nausea, vomiting , diarrhea, fever or chills.     No change in Past Medical History        Objective          Vitals 24HR  Heart Rate:  [32-38]   Temp:  [36.2 °C (97.2 °F)-36.9 °C (98.4 °F)]   Resp:  [9-28]   BP: (102-163)/()   Height:  [177.8 cm (5' 10\")]   Weight:  [83.9 kg (185 lb)-84 kg (185 lb 3 oz)]   SpO2:  [92 %-99 %]     .  Vitals:    05/29/25 0600 05/29/25 0700 05/29/25 0800 05/29/25 0900   BP: 146/50 142/53 (!) 150/120 136/50   Pulse: (!) 33 (!) 32 (!) 33 (!) 32   Resp: 15 15 (!) 9 22   Temp:  36.6 °C (97.8 °F)     TempSrc:  Temporal     SpO2: 94% 94% 97% 96%   Weight:       Height:            Intake/Output last 3 Shifts:    Intake/Output Summary (Last 24 hours) at 5/29/2025 0905  Last data filed at 5/29/2025 0300  Gross per 24 hour   Intake --   Output 175 ml   Net -175 ml       Physical Exam  Constitutional:       Appearance: Normal appearance.   HENT:      Mouth/Throat:      Mouth: Mucous membranes are dry.      Pharynx: Oropharynx is clear.   Eyes:      Extraocular Movements: Extraocular movements intact.      Pupils: Pupils are equal, round, and reactive to light.   Cardiovascular:      Rate and Rhythm: Regular rhythm. Bradycardia present.      Pulses: Normal pulses.      Heart sounds: Normal heart sounds.   Pulmonary:      Effort: Pulmonary effort is normal.      Breath sounds: Normal breath sounds.   Abdominal:      General: Abdomen is flat. Bowel sounds are normal.      Palpations: Abdomen is soft.   Musculoskeletal:      Right lower leg: No edema.      Left lower leg: No edema.   Skin:     General: Skin is warm and dry.   Neurological:      Mental Status: He is alert " and oriented to person, place, and time. Mental status is at baseline.   Psychiatric:         Mood and Affect: Mood normal.         Behavior: Behavior normal.         Relevant Results  .  Results from last 7 days   Lab Units 05/29/25  0436 05/28/25  1047   SODIUM mmol/L 139 139   POTASSIUM mmol/L 4.7 4.4   CHLORIDE mmol/L 109* 110*   CO2 mmol/L 16* 16*   BUN mg/dL 64* 60*   CREATININE mg/dL 4.96* 4.87*   EGFR mL/min/1.73m*2 11* 11*   GLUCOSE mg/dL 113* 150*   CALCIUM mg/dL 8.6 8.9   PHOSPHORUS mg/dL  --  4.0      .  Results from last 7 days   Lab Units 05/29/25  0436 05/28/25  1047   WBC AUTO x10*3/uL 5.6 6.0   HEMOGLOBIN g/dL 7.5* 8.4*   HEMATOCRIT % 24.4* 27.2*   PLATELETS AUTO x10*3/uL 208 223                   GISSELLE/ATN vs CKD progression  2/2 volume depletion hemodynamic fluctuations  N17.0  Chronic metabolic acidosis E87.22  Anemia D64.9   Complete heart block I44.2  Bradycardia  Elevated troponin   CKD 4/5 N18.4  Type 2 diabetes  A-fib    Recommendations  Serum creatinine worsening   GFR fluctuation 11-22%  Avoid all nephrotoxic agent and contrast   Urine studies are consistent with prerenal  Urinalysis shows hyaline cast  We will start gentle IV fluids LR at 50 cc an hour , monitor for signs symptoms of fluid overload  Renal ultrasound reviewed shows asymmetric kidneys, right side 17.6 cm left side 8.5 no hydronephrosis  At very high risk of needing dialysis, plans for PD, which was discussed with outpatient nephrology doctor Dr. Tomlin, when the time comes  avoid blood pressure fluctuations to help with renal perfusion  Chronic metabolic acidosis increase sodium bicarb tablets  Receiving Epogen outpatient, okay for blood transfusions for hemoglobin less than 7, TSAT 10%, ferritin 33, we will supplement iron  Continue with Norvasc 10 mg daily, keep off beta-blockers given heart block  Will defer bradycardia to cardiology team, plans for pacemaker today  Rest of management per ICU team   Assessment &  Plan  Complete heart block                   Milvia Tracy, APRN-CNP

## 2025-05-29 NOTE — PROGRESS NOTES
05/29/25 1126   Discharge Planning   Living Arrangements Spouse/significant other   Support Systems Spouse/significant other   Assistance Needed none   Type of Residence Private residence   Home or Post Acute Services None   Expected Discharge Disposition Home   Does the patient need discharge transport arranged? No   Stroke Family Assessment   Stroke Family Assessment Needed No   Intensity of Service   Intensity of Service 0-30 min     Discharge planning assessment completed with patient. His wife and daughter were present at the bedside as well. Patient and spouse live together in a single story home. Patient reports he is independent with mobility and self care needs. He follows with PCP [place] and typically drives himself to appointments and errands. Patient plans to return home at discharge. He is scheduled for PPM implantation today. PT/OT evals are pending. TCC following.

## 2025-05-30 ENCOUNTER — APPOINTMENT (OUTPATIENT)
Dept: RADIOLOGY | Facility: HOSPITAL | Age: 85
End: 2025-05-30
Payer: MEDICARE

## 2025-05-30 VITALS
BODY MASS INDEX: 26.51 KG/M2 | WEIGHT: 185.19 LBS | RESPIRATION RATE: 8 BRPM | TEMPERATURE: 98.1 F | OXYGEN SATURATION: 91 % | HEIGHT: 70 IN | SYSTOLIC BLOOD PRESSURE: 144 MMHG | HEART RATE: 98 BPM | DIASTOLIC BLOOD PRESSURE: 65 MMHG

## 2025-05-30 PROBLEM — I44.2 COMPLETE HEART BLOCK: Status: RESOLVED | Noted: 2025-05-28 | Resolved: 2025-05-30

## 2025-05-30 LAB
ANION GAP SERPL CALC-SCNC: 13 MMOL/L (ref 10–20)
BUN SERPL-MCNC: 58 MG/DL (ref 6–23)
CALCIUM SERPL-MCNC: 8.1 MG/DL (ref 8.6–10.3)
CHLORIDE SERPL-SCNC: 111 MMOL/L (ref 98–107)
CO2 SERPL-SCNC: 18 MMOL/L (ref 21–32)
CREAT SERPL-MCNC: 4.71 MG/DL (ref 0.5–1.3)
EGFRCR SERPLBLD CKD-EPI 2021: 12 ML/MIN/1.73M*2
ERYTHROCYTE [DISTWIDTH] IN BLOOD BY AUTOMATED COUNT: 17.2 % (ref 11.5–14.5)
GLUCOSE BLD MANUAL STRIP-MCNC: 80 MG/DL (ref 74–99)
GLUCOSE BLD MANUAL STRIP-MCNC: 89 MG/DL (ref 74–99)
GLUCOSE SERPL-MCNC: 89 MG/DL (ref 74–99)
HCT VFR BLD AUTO: 23.3 % (ref 41–52)
HGB BLD-MCNC: 7.2 G/DL (ref 13.5–17.5)
MCH RBC QN AUTO: 30.8 PG (ref 26–34)
MCHC RBC AUTO-ENTMCNC: 30.9 G/DL (ref 32–36)
MCV RBC AUTO: 100 FL (ref 80–100)
NRBC BLD-RTO: 0 /100 WBCS (ref 0–0)
PLATELET # BLD AUTO: 209 X10*3/UL (ref 150–450)
POTASSIUM SERPL-SCNC: 4.7 MMOL/L (ref 3.5–5.3)
RBC # BLD AUTO: 2.34 X10*6/UL (ref 4.5–5.9)
SODIUM SERPL-SCNC: 137 MMOL/L (ref 136–145)
WBC # BLD AUTO: 5 X10*3/UL (ref 4.4–11.3)

## 2025-05-30 PROCEDURE — 36415 COLL VENOUS BLD VENIPUNCTURE: CPT | Performed by: STUDENT IN AN ORGANIZED HEALTH CARE EDUCATION/TRAINING PROGRAM

## 2025-05-30 PROCEDURE — 97162 PT EVAL MOD COMPLEX 30 MIN: CPT | Mod: GP

## 2025-05-30 PROCEDURE — 2500000001 HC RX 250 WO HCPCS SELF ADMINISTERED DRUGS (ALT 637 FOR MEDICARE OP): Performed by: NURSE PRACTITIONER

## 2025-05-30 PROCEDURE — 80048 BASIC METABOLIC PNL TOTAL CA: CPT | Performed by: STUDENT IN AN ORGANIZED HEALTH CARE EDUCATION/TRAINING PROGRAM

## 2025-05-30 PROCEDURE — 85027 COMPLETE CBC AUTOMATED: CPT | Performed by: INTERNAL MEDICINE

## 2025-05-30 PROCEDURE — 99232 SBSQ HOSP IP/OBS MODERATE 35: CPT | Performed by: CLINICAL NURSE SPECIALIST

## 2025-05-30 PROCEDURE — 2500000001 HC RX 250 WO HCPCS SELF ADMINISTERED DRUGS (ALT 637 FOR MEDICARE OP): Performed by: REGISTERED NURSE

## 2025-05-30 PROCEDURE — 71046 X-RAY EXAM CHEST 2 VIEWS: CPT

## 2025-05-30 PROCEDURE — 2500000004 HC RX 250 GENERAL PHARMACY W/ HCPCS (ALT 636 FOR OP/ED): Performed by: REGISTERED NURSE

## 2025-05-30 PROCEDURE — 71046 X-RAY EXAM CHEST 2 VIEWS: CPT | Performed by: RADIOLOGY

## 2025-05-30 PROCEDURE — 97116 GAIT TRAINING THERAPY: CPT | Mod: GP

## 2025-05-30 PROCEDURE — 36415 COLL VENOUS BLD VENIPUNCTURE: CPT | Performed by: INTERNAL MEDICINE

## 2025-05-30 PROCEDURE — 99239 HOSP IP/OBS DSCHRG MGMT >30: CPT | Performed by: INTERNAL MEDICINE

## 2025-05-30 PROCEDURE — 82947 ASSAY GLUCOSE BLOOD QUANT: CPT

## 2025-05-30 PROCEDURE — 2500000004 HC RX 250 GENERAL PHARMACY W/ HCPCS (ALT 636 FOR OP/ED): Performed by: NURSE PRACTITIONER

## 2025-05-30 RX ORDER — AMLODIPINE BESYLATE 5 MG/1
5 TABLET ORAL
Qty: 30 TABLET | Refills: 1 | Status: SHIPPED | OUTPATIENT
Start: 2025-05-30

## 2025-05-30 RX ORDER — AMLODIPINE BESYLATE 5 MG/1
5 TABLET ORAL
Status: DISCONTINUED | OUTPATIENT
Start: 2025-05-31 | End: 2025-05-30 | Stop reason: HOSPADM

## 2025-05-30 RX ORDER — CARVEDILOL 6.25 MG/1
6.25 TABLET ORAL
Qty: 60 TABLET | Refills: 1 | Status: SHIPPED | OUTPATIENT
Start: 2025-05-30

## 2025-05-30 RX ORDER — CARVEDILOL 6.25 MG/1
6.25 TABLET ORAL
Status: DISCONTINUED | OUTPATIENT
Start: 2025-05-30 | End: 2025-05-30 | Stop reason: HOSPADM

## 2025-05-30 RX ORDER — SODIUM BICARBONATE 650 MG/1
1950 TABLET ORAL 3 TIMES DAILY
Qty: 180 TABLET | Refills: 1 | Status: SHIPPED | OUTPATIENT
Start: 2025-05-30

## 2025-05-30 RX ADMIN — HEPARIN SODIUM 5000 UNITS: 5000 INJECTION, SOLUTION INTRAVENOUS; SUBCUTANEOUS at 04:06

## 2025-05-30 RX ADMIN — FERROUS SULFATE TAB 325 MG (65 MG ELEMENTAL FE) 1 TABLET: 325 (65 FE) TAB at 00:05

## 2025-05-30 RX ADMIN — IRON SUCROSE 300 MG: 20 INJECTION, SOLUTION INTRAVENOUS at 06:20

## 2025-05-30 RX ADMIN — Medication 50 MCG: at 08:49

## 2025-05-30 RX ADMIN — AMLODIPINE BESYLATE 10 MG: 10 TABLET ORAL at 06:21

## 2025-05-30 RX ADMIN — SODIUM BICARBONATE 1950 MG: 325 TABLET ORAL at 08:50

## 2025-05-30 RX ADMIN — ALLOPURINOL 100 MG: 100 TABLET ORAL at 08:49

## 2025-05-30 RX ADMIN — PANTOPRAZOLE SODIUM 40 MG: 40 TABLET, DELAYED RELEASE ORAL at 06:21

## 2025-05-30 RX ADMIN — THIAMINE HCL TAB 100 MG 50 MG: 100 TAB at 08:48

## 2025-05-30 RX ADMIN — SODIUM CHLORIDE, POTASSIUM CHLORIDE, SODIUM LACTATE AND CALCIUM CHLORIDE 50 ML/HR: 600; 310; 30; 20 INJECTION, SOLUTION INTRAVENOUS at 00:06

## 2025-05-30 RX ADMIN — FERROUS SULFATE TAB 325 MG (65 MG ELEMENTAL FE) 1 TABLET: 325 (65 FE) TAB at 08:49

## 2025-05-30 RX ADMIN — SODIUM BICARBONATE 1950 MG: 325 TABLET ORAL at 00:06

## 2025-05-30 RX ADMIN — OXYCODONE HYDROCHLORIDE AND ACETAMINOPHEN 500 MG: 500 TABLET ORAL at 08:49

## 2025-05-30 ASSESSMENT — PAIN - FUNCTIONAL ASSESSMENT
PAIN_FUNCTIONAL_ASSESSMENT: 0-10

## 2025-05-30 ASSESSMENT — COGNITIVE AND FUNCTIONAL STATUS - GENERAL
STANDING UP FROM CHAIR USING ARMS: A LITTLE
DRESSING REGULAR UPPER BODY CLOTHING: A LITTLE
TURNING FROM BACK TO SIDE WHILE IN FLAT BAD: A LITTLE
STANDING UP FROM CHAIR USING ARMS: A LITTLE
DRESSING REGULAR LOWER BODY CLOTHING: A LITTLE
TOILETING: A LITTLE
HELP NEEDED FOR BATHING: A LITTLE
WALKING IN HOSPITAL ROOM: A LITTLE
TURNING FROM BACK TO SIDE WHILE IN FLAT BAD: A LITTLE
DAILY ACTIVITIY SCORE: 20
MOVING TO AND FROM BED TO CHAIR: A LITTLE
CLIMB 3 TO 5 STEPS WITH RAILING: A LITTLE
MOBILITY SCORE: 19
CLIMB 3 TO 5 STEPS WITH RAILING: A LITTLE
MOVING FROM LYING ON BACK TO SITTING ON SIDE OF FLAT BED WITH BEDRAILS: A LITTLE
MOBILITY SCORE: 18
MOVING TO AND FROM BED TO CHAIR: A LITTLE
WALKING IN HOSPITAL ROOM: A LITTLE

## 2025-05-30 ASSESSMENT — PAIN SCALES - GENERAL
PAINLEVEL_OUTOF10: 0 - NO PAIN
PAINLEVEL_OUTOF10: 4
PAINLEVEL_OUTOF10: 0 - NO PAIN
PAINLEVEL_OUTOF10: 2

## 2025-05-30 ASSESSMENT — ACTIVITIES OF DAILY LIVING (ADL): ADL_ASSISTANCE: INDEPENDENT

## 2025-05-30 NOTE — DISCHARGE SUMMARY
Discharge Diagnosis  Complete heart block  CKD stage IV with chronic metabolic acidosis  Anemia of chronic disease  Paroxysmal A-fib        This discharge took greater than 35 minutes.    Test Results Pending At Discharge  Pending Labs       No current pending labs.            Hospital Course   85 y.o. male with PMHx s/f  kidney disease followed by [facility], atrial fibrillation, type 2 diabetes, CKD stage IV, GERD, Akers's esophagus, prior history of colon cancer, chronic anemia with periodic transfusions and erythropoietin injections presenting with symptomatic bradycardia.  Heart rate on admission was in the high 30s and patient was feeling lightheadedness/shortness of breath.  He was admitted to ICU with a diagnosis of complete heart block and on pacemaker pads.  EP cardiology consulted-underwent permanent pacemaker placement on 05/29.  X-ray postprocedure did not show any significant abnormality.  Patient was cleared by cardiology for discharge.  He will be discharged home, amlodipine was decreased to 5 mg daily and Coreg 6.25 mg twice daily added.  He will follow-up at device check clinic, PCP and nephrology as an outpatient.    Pertinent Physical Exam At Time of Discharge  Physical Exam  Patient is awake and orient, not in apparent distress  Eyes: PERRLA, no conjunctival congestion  Chest: Bilateral Air entry, no crackles or wheezing  Heart: s1S2 regular, no murmur  Abdomen: Soft, non tender, BS present  Ext:    Home Medications     Medication List      START taking these medications     carvedilol 6.25 mg tablet; Commonly known as: Coreg; Take 1 tablet (6.25   mg) by mouth 2 times daily (morning and late afternoon).   sodium bicarbonate 650 mg tablet; Take 3 tablets (1,950 mg) by mouth 3   times a day.     CHANGE how you take these medications     amLODIPine 5 mg tablet; Commonly known as: Norvasc; Take 1 tablet (5 mg)   by mouth early in the morning..; What changed: medication strength, how    much to take     CONTINUE taking these medications     allopurinol 100 mg tablet; Commonly known as: Zyloprim   ascorbic acid 500 mg chewable tablet; Commonly known as: Vitamin C   cholecalciferol 50 mcg (2,000 units) tablet; Commonly known as: Vitamin   D-3   Eliquis 2.5 mg tablet; Generic drug: apixaban   famotidine 40 mg tablet; Commonly known as: Pepcid   ferrous sulfate 325 mg (65 mg elemental) tablet   FreeStyle Tal 2 Sensor kit; Generic drug: flash glucose sensor kit   Januvia 25 mg tablet; Generic drug: SITagliptin phosphate   pantoprazole 40 mg EC tablet; Commonly known as: ProtoNix   Retacrit 40,000 unit/mL injection; Generic drug: epoetin phi-epbx   thiamine 50 mg tablet; Commonly known as: Vitamin B-1   VITAMIN A ORAL       Outpatient Follow-Up  No follow-ups on file.     Dennise Recinos MD  5/30/2025  11:35 AM

## 2025-05-30 NOTE — CARE PLAN
The patient's goals for the shift include  to rest    The clinical goals for the shift include to remain hemodynamically stable    Over the shift, the patient did  make progress toward the following goals.    The pt. Is AOx4, no pain, RA. Will CTM      Problem: Pain - Adult  Goal: Verbalizes/displays adequate comfort level or baseline comfort level  Outcome: Progressing     Problem: Safety - Adult  Goal: Free from fall injury  Outcome: Progressing     Problem: Discharge Planning  Goal: Discharge to home or other facility with appropriate resources  Outcome: Progressing     Problem: Chronic Conditions and Co-morbidities  Goal: Patient's chronic conditions and co-morbidity symptoms are monitored and maintained or improved  Outcome: Progressing     Problem: Nutrition  Goal: Nutrient intake appropriate for maintaining nutritional needs  Outcome: Progressing     Problem: Diabetes  Goal: Achieve decreasing blood glucose levels by end of shift  Outcome: Progressing  Goal: Increase stability of blood glucose readings by end of shift  Outcome: Progressing  Goal: Decrease in ketones present in urine by end of shift  Outcome: Progressing  Goal: Maintain electrolyte levels within acceptable range throughout shift  Outcome: Progressing  Goal: Maintain glucose levels >70mg/dl to <250mg/dl throughout shift  Outcome: Progressing  Goal: No changes in neurological exam by end of shift  Outcome: Progressing  Goal: Learn about and adhere to nutrition recommendations by end of shift  Outcome: Progressing  Goal: Vital signs within normal range for age by end of shift  Outcome: Progressing  Goal: Increase self care and/or family involovement by end of shift  Outcome: Progressing  Goal: Receive DSME education by end of shift  Outcome: Progressing     Problem: Arrythmia/Dysrhythmia  Goal: Verbalize understanding of procedures/devices  Outcome: Progressing  Goal: Vital signs return to baseline  Outcome: Progressing     Problem: Skin  Goal:  Decreased wound size/increased tissue granulation at next dressing change  Outcome: Progressing  Goal: Participates in plan/prevention/treatment measures  Outcome: Progressing  Goal: Prevent/manage excess moisture  Outcome: Progressing  Goal: Prevent/minimize sheer/friction injuries  Outcome: Progressing  Goal: Promote/optimize nutrition  Outcome: Progressing  Goal: Promote skin healing  Outcome: Progressing     Problem: Skin  Goal: Decreased wound size/increased tissue granulation at next dressing change  Outcome: Progressing  Goal: Participates in plan/prevention/treatment measures  Outcome: Progressing  Goal: Prevent/manage excess moisture  Outcome: Progressing  Goal: Prevent/minimize sheer/friction injuries  Outcome: Progressing  Goal: Promote/optimize nutrition  Outcome: Progressing  Goal: Promote skin healing  Outcome: Progressing

## 2025-05-30 NOTE — CARE PLAN
Problem: Pain - Adult  Goal: Verbalizes/displays adequate comfort level or baseline comfort level  Outcome: Progressing     Problem: Safety - Adult  Goal: Free from fall injury  Outcome: Progressing     Problem: Discharge Planning  Goal: Discharge to home or other facility with appropriate resources  Outcome: Progressing     Problem: Chronic Conditions and Co-morbidities  Goal: Patient's chronic conditions and co-morbidity symptoms are monitored and maintained or improved  Outcome: Progressing     Problem: Nutrition  Goal: Nutrient intake appropriate for maintaining nutritional needs  Outcome: Progressing     Problem: Diabetes  Goal: Achieve decreasing blood glucose levels by end of shift  Outcome: Progressing  Goal: Increase stability of blood glucose readings by end of shift  Outcome: Progressing  Goal: Decrease in ketones present in urine by end of shift  Outcome: Progressing  Goal: Maintain electrolyte levels within acceptable range throughout shift  Outcome: Progressing  Goal: Maintain glucose levels >70mg/dl to <250mg/dl throughout shift  Outcome: Progressing  Goal: No changes in neurological exam by end of shift  Outcome: Progressing  Goal: Learn about and adhere to nutrition recommendations by end of shift  Outcome: Progressing  Goal: Vital signs within normal range for age by end of shift  Outcome: Progressing  Goal: Increase self care and/or family involovement by end of shift  Outcome: Progressing  Goal: Receive DSME education by end of shift  Outcome: Progressing     Problem: Arrythmia/Dysrhythmia  Goal: Verbalize understanding of procedures/devices  Outcome: Progressing  Goal: Vital signs return to baseline  Outcome: Progressing     Problem: Skin  Goal: Decreased wound size/increased tissue granulation at next dressing change  Outcome: Progressing  Flowsheets (Taken 5/30/2025 1033)  Decreased wound size/increased tissue granulation at next dressing change: Protective dressings over bony  prominences  Goal: Participates in plan/prevention/treatment measures  Outcome: Progressing  Flowsheets (Taken 5/30/2025 1033)  Participates in plan/prevention/treatment measures: Discuss with provider PT/OT consult  Goal: Prevent/manage excess moisture  Outcome: Progressing  Flowsheets (Taken 5/30/2025 1033)  Prevent/manage excess moisture:   Moisturize dry skin   Follow provider orders for dressing changes  Goal: Prevent/minimize sheer/friction injuries  Outcome: Progressing  Flowsheets (Taken 5/30/2025 1033)  Prevent/minimize sheer/friction injuries: HOB 30 degrees or less  Goal: Promote/optimize nutrition  Outcome: Progressing  Flowsheets (Taken 5/30/2025 1033)  Promote/optimize nutrition: Consume > 50% meals/supplements  Goal: Promote skin healing  Outcome: Progressing  Flowsheets (Taken 5/30/2025 1033)  Promote skin healing: Protective dressings over bony prominences

## 2025-05-30 NOTE — PROGRESS NOTES
"Occupational Therapy    Screen/Discontinue orders    Patient Name: Jacob Massey \"Doug\"  MRN: [id number]  Department: [facility]  Room: 02/02-A  Today's Date: 5/30/2025       Assessment  not indicated     Plan: complete orders       Subjective   Current Problem:  1. Complete heart block  Case Request Cath Lab: PPM IMPLANT DUAL    Case Request Cath Lab: PPM IMPLANT DUAL    Electrophysiology procedure    Electrophysiology procedure    Cardiac Device Check - In Clinic    Cardiac Device Check - In Clinic      2. Anemia, unspecified type        3. Primary hypertension  amLODIPine (Norvasc) 5 mg tablet    carvedilol (Coreg) 6.25 mg tablet      4. CKD (chronic kidney disease), stage IV (Multi)  sodium bicarbonate 650 mg tablet           General Visit Info:  General  Reason for Referral: OT for ADL assessment  Past Medical History Relevant to Rehab: 85 YOM presented from home with spouse with cardiac concerns. Pt s/p pacemaker placement 5/29.  General Comment: OT orders received and chart reviewed. Per physician and PT, pt is medically stable, demonstrates understanding of acute pacemaker precautions and has been cleared for d/c home. Skilled OT assessment not indicated and eval not completed. Orders discontinued.  "

## 2025-05-30 NOTE — PROGRESS NOTES
"Jacob Massey \"Doug\" is a 85 y.o. male on day 2 of admission presenting with Complete heart block.      Subjective   .Interval History    Patient is feeling okay  On room air  Family at bedside, patient is post pacemaker implantation.  Heart rate is higher  Pressures are rising and running higher.  Having urine output saturating 96% on room air      ROS  Denies chest pain, shortness of breath,  Nausea, vomiting , diarrhea, fever or chills.     No change in Past Medical History        Objective          Vitals 24HR  Heart Rate:  []   Temp:  [36 °C (96.8 °F)-36.8 °C (98.2 °F)]   Resp:  [11-25]   BP: (132-161)/()   SpO2:  [89 %-98 %]     .  Vitals:    05/30/25 0600 05/30/25 0700 05/30/25 0800 05/30/25 0900   BP: 137/63 141/57 143/73    Pulse: 102 96 97 100   Resp: 25 17 21 23   Temp:  36 °C (96.8 °F)     TempSrc:  Temporal     SpO2: 95% 94% 95% 93%   Weight:       Height:            Intake/Output last 3 Shifts:    Intake/Output Summary (Last 24 hours) at 5/30/2025 1003  Last data filed at 5/30/2025 0620  Gross per 24 hour   Intake 1008.33 ml   Output 970 ml   Net 38.33 ml       Physical Exam  Constitutional:       Appearance: Normal appearance.   HENT:      Mouth/Throat:      Mouth: Mucous membranes are moist.      Pharynx: Oropharynx is clear.   Eyes:      Extraocular Movements: Extraocular movements intact.      Pupils: Pupils are equal, round, and reactive to light.   Cardiovascular:      Rate and Rhythm: Normal rate and regular rhythm.      Pulses: Normal pulses.      Heart sounds: Normal heart sounds.   Pulmonary:      Effort: Pulmonary effort is normal.      Breath sounds: Normal breath sounds.   Abdominal:      General: Abdomen is flat. Bowel sounds are normal.      Palpations: Abdomen is soft.   Musculoskeletal:      Right lower leg: No edema.      Left lower leg: No edema.   Skin:     General: Skin is warm and dry.   Neurological:      General: No focal deficit present.      Mental Status: He is " alert and oriented to person, place, and time. Mental status is at baseline.   Psychiatric:         Mood and Affect: Mood normal.         Behavior: Behavior normal.         Relevant Results  .  Results from last 7 days   Lab Units 05/30/25  0520 05/29/25  0436 05/28/25  1047   SODIUM mmol/L 137 139 139   POTASSIUM mmol/L 4.7 4.7 4.4   CHLORIDE mmol/L 111* 109* 110*   CO2 mmol/L 18* 16* 16*   BUN mg/dL 58* 64* 60*   CREATININE mg/dL 4.71* 4.96* 4.87*   EGFR mL/min/1.73m*2 12* 11* 11*   GLUCOSE mg/dL 89 113* 150*   CALCIUM mg/dL 8.1* 8.6 8.9   PHOSPHORUS mg/dL  --   --  4.0      .  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  Hemoglobin is 7.2.  Intravenous iron was ordered.  Renal ultrasound reviewed shows asymmetric kidneys, right side 17.6 cm left side 8.5 no hydronephrosis    Plan for optimal start on an outpatient basis  No objection to discharge from nephrology standpoint  Assessment & Plan  Complete heart block                   Hemal Dunn MD

## 2025-05-30 NOTE — NURSING NOTE
1423-Discharge instructions reviewed with the patient, spouse, and daughter at the bedside. Medication education complete. All questions answered. Meds to be picked up at patient's preferred pharmacy in [place]. IVs removed, all belongings returned to pt. Pt discharged home with spouse via car.

## 2025-05-30 NOTE — PROGRESS NOTES
Subjective Data:  Today, Mr. Massey is resting in bed, wife and daughter at bedside, no acute distress. Denies chest pain or pressure, no palpitations, reports that he is short of breath with minimal exertion. Monitor demonstrates SR with rates in the 90s. Rt upper chest aquacel with small area of old blood that has been marked, no hematoma or ecchymosis at site.     Overnight Events:    None     Objective Data:  Last Recorded Vitals:  Vitals:    05/30/25 0600 05/30/25 0700 05/30/25 0800 05/30/25 0900   BP: 137/63 141/57 143/73    Pulse: 102 96 97 100   Resp: 25 17 21 23   Temp:  36 °C (96.8 °F)     TempSrc:  Temporal     SpO2: 95% 94% 95% 93%   Weight:       Height:           Last Labs:  CBC - 5/30/2025:  3:53 AM  5.0 7.2 209    23.3      CMP - 5/30/2025:  5:20 AM  8.1 6.3 15 --- 0.6   4.0 3.6 17 73      PTT - No results in last year.  _   _ _     TROPHS   Date/Time Value Ref Range Status   05/28/2025 11:46 AM 49 0 - 20 ng/L Final   05/28/2025 10:47 AM 47 0 - 20 ng/L Final     BNP   Date/Time Value Ref Range Status   05/28/2025 10:47  0 - 99 pg/mL Final     HGBA1C   Date/Time Value Ref Range Status   02/17/2023 03:34 PM 5.9 % Final     Comment:          Diagnosis of Diabetes-Adults   Non-Diabetic: < or = 5.6%   Increased risk for developing diabetes: 5.7-6.4%   Diagnostic of diabetes: > or = 6.5%  .       Monitoring of Diabetes                Age (y)     Therapeutic Goal (%)   Adults:          >18           <7.0   Pediatrics:    13-18           <7.5                   7-12           <8.0                   0- 6            7.5-8.5   American Diabetes Association. Diabetes Care 33(S1), Jan 2010.        Last I/O:  I/O last 3 completed shifts:  In: 1058.3 (12.6 mL/kg) [P.O.:50; I.V.:1008.3 (12 mL/kg)]  Out: 1145 (13.6 mL/kg) [Urine:1125 (0.4 mL/kg/hr); Blood:20]  Weight: 84 kg     Past Cardiology Tests (Last 3 Years):  EKG:  Electrocardiogram, 12-lead PRN ACS symptoms 05/28/2025      ECG 12 lead 05/28/2025      ECG  12 Lead 04/30/2025 (Preliminary)      ECG 12 Lead 11/13/2024      ECG 12 lead 08/29/2024      ECG 12 Lead 01/09/2024    Echo:  Transthoracic Echo (TTE) Complete 05/01/2025   Left ventricular ejection fraction is normal, by visual estimate at 60-65%.   2. Spectral Doppler shows a a Grade III (restrictive pattern) of left ventricular diastolic filling with an elevated left atrial pressure.   3. There is normal right ventricular global systolic function.   4. The left atrial size is moderately dilated.   5. Moderate to severely elevated right ventricular systolic pressure.   6. Mild aortic valve stenosis.  Ejection Fractions:  EF   Date/Time Value Ref Range Status   05/01/2025 11:28 AM 63 %      Cath:  No results found for this or any previous visit from the past 1095 days.    Stress Test:  No results found for this or any previous visit from the past 1095 days.    Cardiac Imaging:  No results found for this or any previous visit from the past 1095 days.      Inpatient Medications:  Scheduled Medications[1]  PRN Medications[2]  Continuous Medications[3]    Physical Exam:  Physical Exam  Vitals reviewed.   Constitutional:       Appearance: Normal appearance.   HENT:      Head: Normocephalic.      Mouth/Throat:      Mouth: Mucous membranes are moist.   Cardiovascular:      Rate and Rhythm: Normal rate and regular rhythm.      Pulses: Normal pulses.      Heart sounds: Normal heart sounds.   Pulmonary:      Effort: Pulmonary effort is normal.      Breath sounds: Normal breath sounds. No wheezing, rhonchi or rales.      Comments: Diminished R>L  Abdominal:      General: Bowel sounds are normal. There is no distension.      Palpations: Abdomen is soft.      Tenderness: There is no abdominal tenderness.   Musculoskeletal:      Cervical back: Normal range of motion.      Right lower leg: Edema (trace) present.      Left lower leg: Edema (trace) present.   Skin:     General: Skin is warm and dry.      Comments: Rt upper chest  incision is dry and intact, sm. Marked area of old blood, no hematoma, no ecchymosis.    Neurological:      General: No focal deficit present.      Mental Status: He is alert and oriented to person, place, and time.   Psychiatric:         Mood and Affect: Mood normal.         Behavior: Behavior normal.           Assessment/Plan   CHB / high grade AVB  Symptomatic bradycardia  pAfib  HTN     Symptomatic bradycardia:  LH/dizziness, with intermittent complete heart block.  Hemodynamically stable.  I discussed the case with electrophysiology (Dr. García) who is in agreement that the patient would benefit from a PPM.  Recent TTE 5/1/25 with preserved LVEF 60-65% and no rWMA, grade III DD.  -keep NPO after MN  -hold apixaban  -PPM tomorrow  -monitor closely in ICU  -avoid rate slowing meds  -keep pads on chest in case there is a need for TC pacing overnight  -continue amlodipine 10mg daily    5/30/25:   S/p [employer] DDDR PPM placed yesterday by Dr. García. Rt upper chest site is dry and intact, small old blood noted, no ecchymosis, no hematoma. Discussed activity restrictions with patient and family, written materials provided to patient, family and patient verbalized understanding. Post procedure CXR without pneumothorax, small pleural effusions, will discuss with nephrology on diuretic.  Device interrogation has been completed and reported to be satisfactory. Monitor this morning demonstrates SR with IVCD, rates in the 90s, will resume carvedilol at 6.25 mg BID this morning.  Will reduce amlodipine to 5 mg daily with resuming carvedilol. Hgb stable at 7.2 this morning from 7.5 yesterday. Will leave decision to transfuse PRBC up to medicine service, okay to resume anticoagulation today per Dr. García.  Message sent for OP follow-up.       Addendum: Discussed starting on diuretic with Nephrology, per Dr. Dunn would like to hold off for now d/t fragile state of kidneys.      Code Status:  Full  Code      Marianne Guillaume, APRN-CNP         [1]   Scheduled medications   Medication Dose Route Frequency    allopurinol  100 mg oral Daily    amLODIPine  10 mg oral Daily    [Held by provider] apixaban  2.5 mg oral BID    ascorbic acid  500 mg oral Daily    cholecalciferol  50 mcg oral Daily    ferrous sulfate  1 tablet oral BID    heparin (porcine)  5,000 Units subcutaneous q8h    insulin lispro  0-10 Units subcutaneous TID AC    iron sucrose  300 mg intravenous Daily    pantoprazole  40 mg oral Daily before breakfast    Or    pantoprazole  40 mg intravenous Daily before breakfast    polyethylene glycol  17 g oral Daily    sodium bicarbonate  1,950 mg oral TID    thiamine  50 mg oral Daily   [2]   PRN medications   Medication    bisacodyl    dextrose    dextrose    glucagon    glucagon    guaiFENesin    melatonin    ondansetron    Or    ondansetron   [3]   Continuous Medications   Medication Dose Last Rate

## 2025-05-30 NOTE — PROGRESS NOTES
"Physical Therapy    Physical Therapy Evaluation    Patient Name: Jacob Massey \"Doug\"  MRN: [id number]  Today's Date: 5/30/2025   Time Calculation  Start Time: 1113  Stop Time: 1154  Time Calculation (min): 41 min  02/02-A    Assessment/Plan   PT Assessment  PT Assessment Results: Decreased endurance, Impaired balance, Decreased mobility, Impaired sensation  Rehab Prognosis: Good  Barriers to Discharge Home: No anticipated barriers  Evaluation/Treatment Tolerance: Patient tolerated treatment well (Increased fatigue last 15ft of AMB, no SOB stated or observed)  Medical Staff Made Aware: Yes  End of Session Communication: Bedside nurse  Assessment Comment: Patient AMB 100ft CGA x 1 with VC for safety with no SOB, increased fatigue last 15ft. Patient verbalized and demonstrated precautions during session. Continual monitoring of O2 throughout session with appropriate response. Anticipate good improvement before discharge.  End of Session Patient Position: Up in chair, Alarm on  IP OR SWING BED PT PLAN  Inpatient or Swing Bed: Inpatient  PT Plan  Treatment/Interventions: Transfer training, Gait training, Stair training, Balance training, Neuromuscular re-education, Endurance training, Therapeutic exercise, Therapeutic activity  PT Plan: Ongoing PT  PT Frequency: 3 times per week  PT Discharge Recommendations: Low intensity level of continued care (Cardiac rehab)  PT - OK to Discharge: Yes (When medically cleared)        General Visit Information:  General  Reason for Referral: Impaired mobility  Referred By: Teresa  Past Medical History Relevant to Rehab: Pres: SOB, bradycardia; Dx: complete heart block, anemia; PMHx: HTN, HLD, A-fib, bradycardia, CKD, GI bleed, colon cancer, DM2  Family/Caregiver Present: Yes  Prior to Session Communication: Bedside nurse  Patient Position Received: Bed, 3 rail up, Alarm off, not on at start of session  General Comment: Room 1002. Patient agreeable to therapy. Wife present and both " understand the cardiac pacemaker precautions. Dr. Recinos entered room during session, stated plan is for discharge home.    Home Living:  Home Living  Type of Home: House  Lives With: Spouse  Home Layout: One level  Home Access: Stairs to enter with rails  Entrance Stairs-Number of Steps: 2  Bathroom Shower/Tub: Walk-in shower  Bathroom Equipment: Shower chair with back  Home Living Comments: Patient lives with wife who is independent and can assist as needed. Patient can stay on first floor, does not need to go into basement. Owns sleep number adjustable bed    Prior Level of Function:  Prior Function Per Pt/Caregiver Report  Level of [place]: Independent with ADLs and functional transfers, Independent with homemaking with ambulation  Receives Help From: Family  ADL Assistance: Independent  Homemaking Assistance: Independent  Ambulatory Assistance: Independent  Prior Function Comments: Patient states independent prior. Does not use AD.    Precautions:  Precautions  Medical Precautions: Cardiac precautions, Fall precautions (Recent pacemaker yesterday)  Precautions Comment: Patient has pacemaker precautions that he and wife are aware of and demonstrate during therapy    Vital Signs:     Objective     Pain:  Pain Assessment  0-10 (Numeric) Pain Score: 0 - No pain    Cognition:  Cognition  Overall Cognitive Status: Within Functional Limits  Orientation Level: Oriented X4  Attention: Within Functional Limits  Memory: Within Funtional Limits  Problem Solving: Within Functional Limits  Numeric Reasoning: Within Functional Limits  Abstract Reasoning: Within Functional Limits  Safety/Judgement: Within Functional Limits  Insight: Within function limits  Impulsive: Within functional limits  Processing Speed: Within funtional limits    General Assessments:  General Observation  General Observation: Patient no SOB throughout session. Verbalizes and maintains his pacemaker precautions during session.   Activity  Tolerance  Endurance: Other (Comment) (Patient endurance during therapy session did not limit his ability to participate. Slight fatigue near end of gait AMB.)  Sensation  Sensation Comment: Patient states tingling in fingers  Strength  Strength Comments: BLE grossly WFL        Postural Control  Posture Comment: Patient has mild kyphoric posture with sitting EOB, standing, and during gait  Static Sitting Balance  Static Sitting-Comment/Number of Minutes: Fair+ 10 min SBA  Dynamic Sitting Balance  Dynamic Sitting-Comments: Fair  Static Standing Balance  Static Standing-Comment/Number of Minutes: Fair+ with no AD  Dynamic Standing Balance  Dynamic Standing-Comments: Fair    Functional Assessments:     Bed Mobility  Bed Mobility:  (SBA x 1 supine to sit EOB with cues for pacemaker precautions and safety)  Transfers  Transfer:  (CGA x 1 sit EOB to stand with cues for use of right hand on bed and pacemaker precautions. CGA x 1 stand to sit with cues for hand placement and safety. 4 trails throughout session)  Ambulation/Gait Training  Ambulation/Gait Training Performed:  (AMB no AD 100ft CGA x 1 with cues for safety, pacing, stepping, and balance. Slight fatigue last 15ft of AMB, no SOB stated by patient.)  Stairs  Stairs: No       Extremity/Trunk Assessments:                Outcome Measures:     [facility] Basic Mobility  Turning from your back to your side while in a flat bed without using bedrails: None  Moving from lying on your back to sitting on the side of a flat bed without using bedrails: A little  Moving to and from bed to chair (including a wheelchair): A little  Standing up from a chair using your arms (e.g. wheelchair or bedside chair): A little  To walk in hospital room: A little  Climbing 3-5 steps with railing: A little  Basic Mobility - Total Score: 19                                                             Goals:  Encounter Problems       Encounter Problems (Active)       PT Problem       Gait        Start:  05/30/25    Expected End:  06/13/25       Patient will amb 150 feet with SBA x1 for safety in community           Stairs       Start:  05/30/25    Expected End:  06/13/25       Patient will ascend/descend 2+ stairs with rail/device prn and SBA x1              Pain - Adult            Education Documentation  Precautions, taught by MARTÍN Miramontes at 5/30/2025 12:42 PM.  Learner: Patient  Readiness: Acceptance  Method: Explanation  Response: Verbalizes Understanding  Comment: For safety and mobility with cardiac pacemaker precautions    Mobility Training, taught by MARTÍN Miramontes at 5/30/2025 12:42 PM.  Learner: Patient  Readiness: Acceptance  Method: Explanation  Response: Verbalizes Understanding  Comment: For safety and mobility with cardiac pacemaker precautions    Education Comments  No comments found.        Completion of this session, clinical decision making, and documentation performed under the supervision/direction of Ceci Clayton.    MARTÍN MIRAMONTES

## 2025-06-02 ENCOUNTER — APPOINTMENT (OUTPATIENT)
Dept: CARDIOLOGY | Facility: HOSPITAL | Age: 85
End: 2025-06-02
Payer: MEDICARE

## 2025-06-02 LAB
ATRIAL RATE: 80 BPM
P AXIS: 88 DEGREES
P OFFSET: 132 MS
P ONSET: 98 MS
PR INTERVAL: 198 MS
Q ONSET: 197 MS
QRS COUNT: 13 BEATS
QRS DURATION: 148 MS
QT INTERVAL: 456 MS
QTC CALCULATION(BAZETT): 525 MS
QTC FREDERICIA: 502 MS
R AXIS: 66 DEGREES
T AXIS: -86 DEGREES
T OFFSET: 425 MS
VENTRICULAR RATE: 80 BPM

## 2025-06-03 ENCOUNTER — HOSPITAL ENCOUNTER (OUTPATIENT)
Dept: CARDIOLOGY | Facility: HOSPITAL | Age: 85
Discharge: HOME | End: 2025-06-03
Payer: MEDICARE

## 2025-06-03 DIAGNOSIS — I44.2 ATRIOVENTRICULAR BLOCK, COMPLETE (MULTI): ICD-10-CM

## 2025-06-03 DIAGNOSIS — Z95.0 PRESENCE OF CARDIAC PACEMAKER: ICD-10-CM

## 2025-06-05 NOTE — PROGRESS NOTES
Electrophysiology Visit    History of Present Illness:  Jacob Massey is a 85 y.o. year old male patient with history of atrial fibrillation, bradycardia, hypertension, CKD stage IV, colon CA with partial colectomy, diabetes, GI bleed requiring transfusion, anemia.    Patient referred to electrophysiology due to bradycardia and AV block.  Patient presented to PCP had found him bradycardic with heart rate in the 40s. He followed up with general cardiology in April 2025 due to fatigue with occasional dizziness and lightheadedness. Carvedilol was placed on hold. He is followed closely by Dr Tomlin, nephrology, requiring transfusion for anemia and plan for peritoneal dialysis initiation.    Patient was seen in the office later in May. He was in sinus rhythm at that time. Agreeable to pacemaker implant.   Patient presented to emergency department the following week in complete heart block. He had Medtronic dual chamber pacemaker with left bundle branch pacing implanted on 5/29/2025.    Patient here for follow up post pacemaker implant.  Patient reports he has has improved energy and lightheadedness since pacemaker implant.  He has adhered to activity restrictions.  He has dialysis catheter procedure scheduled later this month.    Medical History:  He has a past medical history of Anemia, Atrial fibrillation (Multi), Chronic kidney disease, CKD (chronic kidney disease), stage IV (Multi), Colon cancer (Multi), Diabetes mellitus (Multi), GERD (gastroesophageal reflux disease), Hypertension, Prostate cancer (Multi), and Skin cancer.    Surgical History:  He has a past surgical history that includes Other surgical history (02/21/2022); Other surgical history (02/21/2022); CT angio abdomen pelvis w and or wo IV IV contrast (04/06/2022); Cataract extraction; Tonsillectomy; Cholecystectomy; Colonoscopy; Upper gastrointestinal endoscopy; and Cardiac electrophysiology procedure (N/A, 5/29/2025).     Social History:  He reports  that he has quit smoking. His smoking use included cigarettes. He has never used smokeless tobacco. He reports that he does not currently use alcohol. He reports that he does not use drugs.       Family History[1]     ROS:  A 14 point review of systems was done and is negative other than as stated in HPI    Physical Exam  Constitutional:       Appearance: Normal appearance.   Eyes:      Pupils: Pupils are equal, round, and reactive to light.   Cardiovascular:      Rate and Rhythm: Normal rate and regular rhythm.      Heart sounds: Normal heart sounds.   Pulmonary:      Effort: Pulmonary effort is normal.      Breath sounds: Normal breath sounds.   Musculoskeletal:         General: Normal range of motion.   Skin:     General: Skin is warm and dry.   Neurological:      Mental Status: He is alert and oriented to person, place, and time.       Allergies:  Tetracycline, Adhesive tape-silicones, Penicillin, Penicillin v potassium, and Tetracycline hcl    Outpatient Medications:  Current Outpatient Medications   Medication Instructions    allopurinol (ZYLOPRIM) 100 mg    amLODIPine (NORVASC) 5 mg, oral, Daily (0630)    apixaban (Eliquis) 2.5 mg tablet 1 tablet, 2 times daily    ascorbic acid (VITAMIN C) 500 mg, Daily    carvedilol (COREG) 6.25 mg, oral, 2 times daily (morning and late afternoon)    cholecalciferol (VITAMIN D-3) 2,000 Units, Daily    famotidine (Pepcid) 40 mg tablet 1 tablet, Daily (0630)    ferrous sulfate 325 mg, 2 times daily    FreeStyle Tal 2 Sensor kit as directed    Januvia 25 mg tablet 1 tablet, Daily    Retacrit 40,000 Units, Every 30 days    sodium bicarbonate 1,950 mg, oral, 3 times daily    thiamine (VITAMIN B-1) 50 mg, Daily    VITAMIN A ORAL 1 tablet, Daily      Reviewed Labs:  CBC  Lab Results   Component Value Date    WBC 5.0 05/30/2025    HGB 7.2 (L) 05/30/2025    HCT 23.3 (L) 05/30/2025     05/30/2025     05/30/2025      Renal Function Panel  Lab Results   Component Value  "Date    GLUCOSE 89 05/30/2025     05/30/2025    K 4.7 05/30/2025     (H) 05/30/2025    CO2 18 (L) 05/30/2025    ANIONGAP 13 05/30/2025    BUN 58 (H) 05/30/2025    CREATININE 4.71 (H) 05/30/2025    GFRMALE 25 (A) 02/17/2023    CALCIUM 8.1 (L) 05/30/2025      CMP  Lab Results   Component Value Date    CALCIUM 8.1 (L) 05/30/2025    PHOS 4.0 05/28/2025    PROT 6.3 (L) 05/28/2025    ALBUMIN 3.6 05/28/2025    AST 15 05/28/2025    ALT 17 05/28/2025    ALKPHOS 73 05/28/2025    BILITOT 0.6 05/28/2025      Mg   Lab Results   Component Value Date    MG 2.40 05/28/2025      Thyroid  Lab Results   Component Value Date    TSH 5.70 (H) 04/07/2022        Visit Vitals  /64   Pulse 64   Ht 1.778 m (5' 10\")   Wt 83.9 kg (185 lb)   BMI 26.54 kg/m²   Smoking Status Former   BSA 2.04 m²        Cardiac Testing Reviewed Study(s):  Echo (May/2025):   CONCLUSIONS:   1. Left ventricular ejection fraction is normal, by visual estimate at 60-65%.   2. Spectral Doppler shows a a Grade III (restrictive pattern) of left ventricular diastolic filling with an elevated left atrial pressure.   3. There is normal right ventricular global systolic function.   4. The left atrial size is moderately dilated.   5. Moderate to severely elevated right ventricular systolic pressure.   6. Mild aortic valve stenosis.  Monitor (April/May/2025):  Average heart rate 71 bpm  <1% SVC burden  <1% PVC burden  Excessive episodes of 2:1 second-degree AV block   Single episode of NSVT 4 beats  No atrial fibrillation detected  Symptoms correlated with AV block  ECGs (reviewed and my interpretation):   5/23/2025 sinus rhythm with heart rate of 94 bpm with RBBB and LAFB   6/9/2025 ventricular pacing with intermittent atrial pacing, underlying sinus rhythm    [id number] score    Stroke, TIA or thromboembolism - No, 0 points, Hypertension - Yes, 1 point, Diabetes - Yes, 1 point, Congestive Heart Failure  or Left ventricular Dysfunction - No, 0 points, " Vascular Disease - No, 0 points, Female - No, 0 points, and Age over 75 years - 2 points  [id number] score is 4.   Continue apixaban 2.5 mg BID      Assessment  AV block:  2:1AV block noted during cardiology visit in April 2025. Coreg discontinued  7 day monitor in April 2025 showed excessive episodes of 2:1 AV block  Echocardiogram May 2025 showed normal LV function 60-65%  Presented to emergency department in late May with CHB  5/29/2025 Medtronic dual chamber pacemaker with left bundle branch pacing implant    ECG today personally reviewed which shows ventricular pacing with intermittent atrial pacing with underlying sinus rhythm.  Remote device check and pacemaker functioning.  Left chest device site well-approximated with no hematoma, erythema, or drainage.  Reviewed activity restrictions that we will continue for the next month.  Patient is scheduled with in-clinic device check next month.  We will continue to follow patient device clinic.      Atrial fibrillation:  Noted on event monitor in 2022 with 11% burden  Paroxsymal in nature  Continued on OAC though has significant anemia requiring transfusion  [id number] score is 4 (age, htn, dm)    No atrial fibrillation detected on remote device check.  Did discuss possible Watchman with patient as [id number] score is 4 with history of significant anemia requiring transfusion and CKD with peritoneal dialysis starting later this month.  Will refer patient to Dr. Reyes for further evaluation.  Would plan to wait for Watchman implant until the end of August, 90 days post pacemaker implant to reduce possible risk of infection. Will continue Eliquis 2.5 mg for now.    Plan  Continue Eliquis 2.5 mg twice daily  Continue activity restrictions for the next month  Follow-up with device clinic as scheduled next month  Referral to Dr. Reyes for Watchman      Exclusive of any other services or procedures performed, IElaine, APRN-CNP , spent 30 minutes in  duration for this visit today.  This time consisted of chart review, obtaining history, and/or performing the exam as documented above as well as documenting the clinical information for the encounter in the electronic record, discussing treatment options, plans, and/or goals with patient, family, and/or caregiver, refilling medications, updating the electronic record, ordering medicines, lab work, imaging, referrals, and/or procedures as documented above and communicating with other [facility] professionals. I have discussed the results of laboratory, radiology, and cardiology studies with the patient and their family/caregiver.          [1]   Family History  Problem Relation Name Age of Onset    Colon cancer Father

## 2025-06-09 ENCOUNTER — OFFICE VISIT (OUTPATIENT)
Dept: CARDIOLOGY | Facility: HOSPITAL | Age: 85
End: 2025-06-09
Payer: MEDICARE

## 2025-06-09 VITALS
SYSTOLIC BLOOD PRESSURE: 138 MMHG | BODY MASS INDEX: 26.48 KG/M2 | WEIGHT: 185 LBS | HEART RATE: 64 BPM | DIASTOLIC BLOOD PRESSURE: 64 MMHG | HEIGHT: 70 IN

## 2025-06-09 DIAGNOSIS — Z79.01 CURRENT USE OF LONG TERM ANTICOAGULATION: Primary | ICD-10-CM

## 2025-06-09 DIAGNOSIS — Z95.0 PRESENCE OF CARDIAC PACEMAKER: ICD-10-CM

## 2025-06-09 DIAGNOSIS — I48.0 PAROXYSMAL ATRIAL FIBRILLATION (MULTI): ICD-10-CM

## 2025-06-09 PROCEDURE — 93005 ELECTROCARDIOGRAM TRACING: CPT | Performed by: NURSE PRACTITIONER

## 2025-06-09 PROCEDURE — 1160F RVW MEDS BY RX/DR IN RCRD: CPT | Performed by: NURSE PRACTITIONER

## 2025-06-09 PROCEDURE — 99214 OFFICE O/P EST MOD 30 MIN: CPT | Performed by: NURSE PRACTITIONER

## 2025-06-09 PROCEDURE — 1036F TOBACCO NON-USER: CPT | Performed by: NURSE PRACTITIONER

## 2025-06-09 PROCEDURE — 99213 OFFICE O/P EST LOW 20 MIN: CPT | Performed by: NURSE PRACTITIONER

## 2025-06-09 PROCEDURE — 93010 ELECTROCARDIOGRAM REPORT: CPT | Performed by: INTERNAL MEDICINE

## 2025-06-09 PROCEDURE — 3075F SYST BP GE 130 - 139MM HG: CPT | Performed by: NURSE PRACTITIONER

## 2025-06-09 PROCEDURE — 1159F MED LIST DOCD IN RCRD: CPT | Performed by: NURSE PRACTITIONER

## 2025-06-09 PROCEDURE — 3078F DIAST BP <80 MM HG: CPT | Performed by: NURSE PRACTITIONER

## 2025-06-09 PROCEDURE — 1111F DSCHRG MED/CURRENT MED MERGE: CPT | Performed by: NURSE PRACTITIONER

## 2025-06-09 NOTE — PATIENT INSTRUCTIONS
Continue activity restrictions for the next month  Referral to Dr Reyes for Watchman  Follow up with device next month

## 2025-06-10 LAB
ATRIAL RATE: 64 BPM
P AXIS: 81 DEGREES
P OFFSET: 160 MS
P ONSET: 117 MS
PR INTERVAL: 198 MS
Q ONSET: 216 MS
QRS COUNT: 10 BEATS
QRS DURATION: 118 MS
QT INTERVAL: 448 MS
QTC CALCULATION(BAZETT): 462 MS
QTC FREDERICIA: 457 MS
R AXIS: 4 DEGREES
T AXIS: 46 DEGREES
T OFFSET: 440 MS
VENTRICULAR RATE: 64 BPM

## 2025-06-17 ENCOUNTER — TELEPHONE (OUTPATIENT)
Dept: CARDIOLOGY | Facility: HOSPITAL | Age: 85
End: 2025-06-17
Payer: MEDICARE

## 2025-06-17 NOTE — TELEPHONE ENCOUNTER
Confirmed w/ pt that PCP manages Eliquis. Faxed med hold request form to Dr. Avila and received success sheet. PT aware their office will facilitate med hold permission as they manage this med.

## 2025-06-18 ENCOUNTER — ANCILLARY PROCEDURE (OUTPATIENT)
Dept: CARDIOLOGY | Facility: HOSPITAL | Age: 85
End: 2025-06-18
Payer: MEDICARE

## 2025-06-18 DIAGNOSIS — I48.0 PAROXYSMAL ATRIAL FIBRILLATION (MULTI): Primary | ICD-10-CM

## 2025-06-18 PROCEDURE — 93005 ELECTROCARDIOGRAM TRACING: CPT

## 2025-06-29 LAB
ATRIAL RATE: 94 BPM
P AXIS: 43 DEGREES
P OFFSET: 129 MS
P ONSET: 88 MS
PR INTERVAL: 208 MS
Q ONSET: 192 MS
QRS COUNT: 16 BEATS
QRS DURATION: 142 MS
QT INTERVAL: 392 MS
QTC CALCULATION(BAZETT): 490 MS
QTC FREDERICIA: 455 MS
R AXIS: -66 DEGREES
T AXIS: 65 DEGREES
T OFFSET: 388 MS
VENTRICULAR RATE: 94 BPM

## 2025-07-02 ENCOUNTER — APPOINTMENT (OUTPATIENT)
Dept: CARDIOLOGY | Facility: HOSPITAL | Age: 85
End: 2025-07-02
Payer: MEDICARE

## 2025-07-02 ASSESSMENT — ENCOUNTER SYMPTOMS
ENDOCRINE NEGATIVE: 1
SHORTNESS OF BREATH: 0
BRUISES/BLEEDS EASILY: 0
SYNCOPE: 0
HEMATEMESIS: 0
MUSCULOSKELETAL NEGATIVE: 1
NEAR-SYNCOPE: 0
EYES NEGATIVE: 1
HEMATURIA: 0
GASTROINTESTINAL NEGATIVE: 1

## 2025-07-02 NOTE — PROGRESS NOTES
"Primary Cardiologist: Dr. Aguirre    Chief Complaint:   Follow-up     History Of Present Illness:    Jacob Massey \"Doug\" is a 85 y.o. male with past medical history of pAfib, [facility] (s/p Medtronic dual chamber PPM 5/25) HTN, CKD, colon CA with partial colectomy, CKD following with Nephrology,  GIB in 2022 (Hb 6.7, required 4u pRBC) who presents for follow up.     Today, Jacob Massey \"Doug\" is feeling improved since getting PPM. He did have PD catheter placed and is awaiting to start dialysis. He has no cardiac concerns today, reports his blood pressure is well controlled at home. Denies chest pain or pressure, no dizziness or lightheadedness.       Last Recorded Vitals:  Visit Vitals  /80 (BP Location: Left arm, Patient Position: Sitting, BP Cuff Size: Adult)   Pulse 71   Ht 1.778 m (5' 10\")   Wt 83.9 kg (185 lb)   SpO2 98%   BMI 26.54 kg/m²   Smoking Status Former   BSA 2.04 m²        Past Medical History:  He has a past medical history of Anemia, Atrial fibrillation (Multi), Chronic kidney disease, CKD (chronic kidney disease), stage IV (Multi), Colon cancer (Multi), Diabetes mellitus (Multi), GERD (gastroesophageal reflux disease), Hypertension, Prostate cancer (Multi), and Skin cancer.    Past Surgical History:  He has a past surgical history that includes Other surgical history (02/21/2022); Other surgical history (02/21/2022); CT angio abdomen pelvis w and or wo IV IV contrast (04/06/2022); Cataract extraction; Tonsillectomy; Cholecystectomy; Colonoscopy; Upper gastrointestinal endoscopy; and Cardiac electrophysiology procedure (N/A, 5/29/2025).      Social History:  He reports that he has quit smoking. His smoking use included cigarettes. He has never used smokeless tobacco. He reports that he does not currently use alcohol. He reports that he does not use drugs.    Family History:  Family History[1]     Allergies:  Tetracycline, Adhesive tape-silicones, Penicillin, Penicillin v potassium, and " Tetracycline hcl    Outpatient Medications:  Current Outpatient Medications   Medication Instructions    allopurinol (ZYLOPRIM) 100 mg    amLODIPine (NORVASC) 5 mg, oral, Daily (0630)    apixaban (Eliquis) 2.5 mg tablet 1 tablet, 2 times daily    ascorbic acid (VITAMIN C) 500 mg, Daily    carvedilol (COREG) 6.25 mg, oral, 2 times daily (morning and late afternoon)    cholecalciferol (VITAMIN D-3) 2,000 Units, Daily    famotidine (Pepcid) 40 mg tablet 1 tablet, Daily (0630)    ferrous sulfate 325 mg, 2 times daily    FreeStyle Tal 2 Sensor kit as directed    Januvia 25 mg tablet 1 tablet, Daily    Retacrit 40,000 Units, Every 30 days    sodium bicarbonate 1,950 mg, oral, 3 times daily    thiamine (VITAMIN B-1) 50 mg, Daily    VITAMIN A ORAL 1 tablet, Daily         Review of Systems   Constitutional: Negative for malaise/fatigue.   HENT: Negative.     Eyes: Negative.    Cardiovascular:  Positive for leg swelling (chronic,L>R). Negative for chest pain, dyspnea on exertion, near-syncope, palpitations and syncope.   Respiratory:  Negative for shortness of breath.    Endocrine: Negative.    Hematologic/Lymphatic: Does not bruise/bleed easily.   Skin: Negative.    Musculoskeletal: Negative.    Gastrointestinal: Negative.  Negative for hematemesis and melena.   Genitourinary:  Negative for hematuria.   Neurological:  Negative for dizziness and light-headedness.        Physical Exam  Vitals reviewed.   Constitutional:       Appearance: Normal appearance.   HENT:      Head: Normocephalic.      Mouth/Throat:      Mouth: Mucous membranes are moist.   Cardiovascular:      Rate and Rhythm: Normal rate and regular rhythm.      Pulses: Normal pulses.      Heart sounds: Normal heart sounds.   Pulmonary:      Effort: Pulmonary effort is normal.      Breath sounds: Normal breath sounds. No wheezing, rhonchi or rales.   Abdominal:      General: Bowel sounds are normal. There is no distension.      Palpations: Abdomen is soft.       "Tenderness: There is no abdominal tenderness.   Musculoskeletal:      Cervical back: Normal range of motion.      Right lower leg: No edema (trace).      Left lower leg: Edema (+1) present.   Skin:     General: Skin is warm and dry.   Neurological:      General: No focal deficit present.      Mental Status: He is alert and oriented to person, place, and time.   Psychiatric:         Mood and Affect: Mood normal.         Behavior: Behavior normal.         Last Labs:  CBC -  Lab Results   Component Value Date    WBC 5.0 05/30/2025    HGB 7.2 (L) 05/30/2025    HCT 23.3 (L) 05/30/2025     05/30/2025     05/30/2025       CMP -  Lab Results   Component Value Date    CALCIUM 8.1 (L) 05/30/2025    PHOS 4.0 05/28/2025    PROT 6.3 (L) 05/28/2025    ALBUMIN 3.6 05/28/2025    AST 15 05/28/2025    ALT 17 05/28/2025    ALKPHOS 73 05/28/2025    BILITOT 0.6 05/28/2025       LIPID PANEL -   No results found for: \"CHOL\", \"TRIG\", \"HDL\", \"CHHDL\", \"LDLF\", \"VLDL\", \"NHDL\"    RENAL FUNCTION PANEL -   Lab Results   Component Value Date    GLUCOSE 89 05/30/2025     05/30/2025    K 4.7 05/30/2025     (H) 05/30/2025    CO2 18 (L) 05/30/2025    ANIONGAP 13 05/30/2025    BUN 58 (H) 05/30/2025    CREATININE 4.71 (H) 05/30/2025    GFRMALE 25 (A) 02/17/2023    CALCIUM 8.1 (L) 05/30/2025    PHOS 4.0 05/28/2025    ALBUMIN 3.6 05/28/2025        Lab Results   Component Value Date     (H) 05/28/2025    HGBA1C 5.9 (A) 02/17/2023       Last Cardiology Tests:  ECG:        Echo:  TTE 5/1/25:   Left ventricular ejection fraction is normal, by visual estimate at 60-65%.   2. Spectral Doppler shows a a Grade III (restrictive pattern) of left ventricular diastolic filling with an elevated left atrial pressure.   3. There is normal right ventricular global systolic function.   4. The left atrial size is moderately dilated.   5. Moderate to severely elevated right ventricular systolic pressure.   6. Mild aortic valve " "stenosis.    TTE 4/7/22:   . The left ventricular systolic function is normal with a 60-65% estimated ejection fraction.   2. Spectral Doppler shows an impaired relaxation pattern of left ventricular diastolic filling.   3. The left atrium is mild to moderately dilated.  Ejection Fractions:  EF   Date/Time Value Ref Range Status   05/01/2025 11:28 AM 63 %        Cath:  No results found for this or any previous visit from the past 1095 days.      Stress Test:      Cardiac Imaging:  No results found for this or any previous visit from the past 1095 days.        Lab review: I have personally reviewed the laboratory result(s)     Assessment/Plan   Jacob Massey \"Doug\" is a 85 y.o. male with past medical history of pAfib, [facility] (s/p Medtronic dual chamber PPM 5/25) HTN, CKD, colon CA with partial colectomy, CKD following with Nephrology,  GIB in 2022 (Hb 6.7, required 4u pRBC) who presents for follow up.     Paroxysmal atrial fibrillation  TTE 4/2022: Normal LVEF, diastolic dysfunction  Recently noted to be bradycardic and Holter monitor ordered and patient to follow with EP  Patient presented to ED 5/25 in [facility] and underwent PPM  Today, denies palpitations, improved overall since getting PPM  Continue on apixaban 2.5 mg twice daily for thromboembolism prophylaxis, no abnormal bleeding reported.   Continue on carvedilol 6.25 mg twice daily  Following with EP    Complete heart block  s/p Medtronic dual chamber PPM 5/25  PPM pocket well-approximated,healed, no discharge  Following with EP and Device Clinic    Hypertension  Today's BP  120/80  Well controlled  Continue on amlodipine 5 mg daily, carvedilol 6.25 mg twice daily    CKD, stage IV  Last BUN/Cr 6/25: 33/3.67, K 5.2  Follows with Nephrology  Patient to begin PD soon    GIB (2022)/History of anemia  Patient reports no abnormal bleeding  H/H  6/16: 8.2/26.8  Patient taking Epo injections.     GURPREET Quigley-BRAXTON              [1]   Family History  Problem " Relation Name Age of Onset    Colon cancer Father

## 2025-07-03 ENCOUNTER — HOSPITAL ENCOUNTER (OUTPATIENT)
Dept: CARDIOLOGY | Facility: HOSPITAL | Age: 85
Discharge: HOME | End: 2025-07-03
Payer: MEDICARE

## 2025-07-03 ENCOUNTER — OFFICE VISIT (OUTPATIENT)
Dept: CARDIOLOGY | Facility: HOSPITAL | Age: 85
End: 2025-07-03
Payer: MEDICARE

## 2025-07-03 VITALS
SYSTOLIC BLOOD PRESSURE: 120 MMHG | DIASTOLIC BLOOD PRESSURE: 80 MMHG | HEART RATE: 71 BPM | HEIGHT: 70 IN | OXYGEN SATURATION: 98 % | BODY MASS INDEX: 26.48 KG/M2 | WEIGHT: 185 LBS

## 2025-07-03 DIAGNOSIS — I48.0 PAROXYSMAL ATRIAL FIBRILLATION (MULTI): ICD-10-CM

## 2025-07-03 DIAGNOSIS — I44.2 ATRIOVENTRICULAR BLOCK, COMPLETE (MULTI): ICD-10-CM

## 2025-07-03 DIAGNOSIS — R00.1 BRADYCARDIA: ICD-10-CM

## 2025-07-03 DIAGNOSIS — I10 PRIMARY HYPERTENSION: Primary | ICD-10-CM

## 2025-07-03 DIAGNOSIS — I10 BENIGN ESSENTIAL HYPERTENSION: ICD-10-CM

## 2025-07-03 DIAGNOSIS — Z95.0 PRESENCE OF CARDIAC PACEMAKER: ICD-10-CM

## 2025-07-03 DIAGNOSIS — Z95.0 PRESENCE OF CARDIAC PACEMAKER: Primary | ICD-10-CM

## 2025-07-03 PROCEDURE — 99213 OFFICE O/P EST LOW 20 MIN: CPT | Performed by: CLINICAL NURSE SPECIALIST

## 2025-07-03 PROCEDURE — 3079F DIAST BP 80-89 MM HG: CPT | Performed by: CLINICAL NURSE SPECIALIST

## 2025-07-03 PROCEDURE — 1036F TOBACCO NON-USER: CPT | Performed by: CLINICAL NURSE SPECIALIST

## 2025-07-03 PROCEDURE — 99212 OFFICE O/P EST SF 10 MIN: CPT | Performed by: CLINICAL NURSE SPECIALIST

## 2025-07-03 PROCEDURE — 93280 PM DEVICE PROGR EVAL DUAL: CPT

## 2025-07-03 PROCEDURE — 1159F MED LIST DOCD IN RCRD: CPT | Performed by: CLINICAL NURSE SPECIALIST

## 2025-07-03 PROCEDURE — 3074F SYST BP LT 130 MM HG: CPT | Performed by: CLINICAL NURSE SPECIALIST

## 2025-07-03 ASSESSMENT — ENCOUNTER SYMPTOMS
DIZZINESS: 0
LIGHT-HEADEDNESS: 0
DYSPNEA ON EXERTION: 0
PALPITATIONS: 0

## 2025-07-03 NOTE — PATIENT INSTRUCTIONS
Call our office with any new cardiac concerns  Continue current medications  Continue heart-healthy diet. A diet low in sodium, low in cholesterol, limiting red meats and eating whole foods.   Follow up with Dr. Aguirre in 3 months

## 2025-08-05 ENCOUNTER — APPOINTMENT (OUTPATIENT)
Dept: CARDIOLOGY | Facility: HOSPITAL | Age: 85
End: 2025-08-05
Payer: MEDICARE

## 2025-08-11 ENCOUNTER — HOSPITAL ENCOUNTER (OUTPATIENT)
Dept: CARDIOLOGY | Facility: HOSPITAL | Age: 85
Discharge: HOME | End: 2025-08-11
Payer: MEDICARE

## 2025-08-11 ENCOUNTER — OFFICE VISIT (OUTPATIENT)
Dept: CARDIOLOGY | Facility: CLINIC | Age: 85
End: 2025-08-11
Payer: MEDICARE

## 2025-08-11 VITALS
WEIGHT: 184 LBS | BODY MASS INDEX: 26.34 KG/M2 | SYSTOLIC BLOOD PRESSURE: 142 MMHG | HEART RATE: 67 BPM | HEIGHT: 70 IN | DIASTOLIC BLOOD PRESSURE: 65 MMHG

## 2025-08-11 DIAGNOSIS — Z95.0 PRESENCE OF CARDIAC PACEMAKER: ICD-10-CM

## 2025-08-11 DIAGNOSIS — I48.0 PAROXYSMAL ATRIAL FIBRILLATION (MULTI): Primary | ICD-10-CM

## 2025-08-11 DIAGNOSIS — I44.2 ATRIOVENTRICULAR BLOCK, COMPLETE (MULTI): ICD-10-CM

## 2025-08-11 DIAGNOSIS — Z91.89 AT RISK FOR STROKE: ICD-10-CM

## 2025-08-11 DIAGNOSIS — Z87.19 HISTORY OF GI BLEED: ICD-10-CM

## 2025-08-11 DIAGNOSIS — Z95.0 PRESENCE OF CARDIAC PACEMAKER: Primary | ICD-10-CM

## 2025-08-11 DIAGNOSIS — I48.0 PAROXYSMAL ATRIAL FIBRILLATION (MULTI): ICD-10-CM

## 2025-08-11 PROCEDURE — 99202 OFFICE O/P NEW SF 15 MIN: CPT

## 2025-08-11 PROCEDURE — 3077F SYST BP >= 140 MM HG: CPT | Performed by: INTERNAL MEDICINE

## 2025-08-11 PROCEDURE — 1159F MED LIST DOCD IN RCRD: CPT | Performed by: INTERNAL MEDICINE

## 2025-08-11 PROCEDURE — 93005 ELECTROCARDIOGRAM TRACING: CPT | Performed by: INTERNAL MEDICINE

## 2025-08-11 PROCEDURE — 99205 OFFICE O/P NEW HI 60 MIN: CPT | Performed by: INTERNAL MEDICINE

## 2025-08-11 PROCEDURE — 1160F RVW MEDS BY RX/DR IN RCRD: CPT | Performed by: INTERNAL MEDICINE

## 2025-08-11 PROCEDURE — 3078F DIAST BP <80 MM HG: CPT | Performed by: INTERNAL MEDICINE

## 2025-08-11 RX ORDER — AMLODIPINE BESYLATE 10 MG/1
TABLET ORAL
COMMUNITY
Start: 2025-08-10

## 2025-08-11 RX ORDER — RENO CAPS 100; 1.5; 1.7; 20; 10; 1; 150; 5; 6 MG/1; MG/1; MG/1; MG/1; MG/1; MG/1; UG/1; MG/1; UG/1
1 CAPSULE ORAL
COMMUNITY
Start: 2025-08-01

## 2025-08-11 RX ORDER — KETOCONAZOLE 20 MG/ML
SHAMPOO, SUSPENSION TOPICAL
COMMUNITY
Start: 2025-07-31

## 2025-08-11 ASSESSMENT — ENCOUNTER SYMPTOMS
NEUROLOGICAL NEGATIVE: 1
DYSPNEA ON EXERTION: 1
PALPITATIONS: 1
SHORTNESS OF BREATH: 1
EYES NEGATIVE: 1
ENDOCRINE NEGATIVE: 1
IRREGULAR HEARTBEAT: 1
MUSCULOSKELETAL NEGATIVE: 1
PSYCHIATRIC NEGATIVE: 1
GASTROINTESTINAL NEGATIVE: 1

## 2025-08-12 PROBLEM — G47.30 SLEEP APNEA: Status: ACTIVE | Noted: 2025-08-12

## 2025-08-14 LAB
ATRIAL RATE: 56 BPM
Q ONSET: 224 MS
QRS COUNT: 11 BEATS
QRS DURATION: 136 MS
QT INTERVAL: 468 MS
QTC CALCULATION(BAZETT): 494 MS
QTC FREDERICIA: 485 MS
R AXIS: -25 DEGREES
T AXIS: 24 DEGREES
T OFFSET: 458 MS
VENTRICULAR RATE: 67 BPM

## 2025-08-17 ENCOUNTER — DOCUMENTATION (OUTPATIENT)
Dept: CARDIOLOGY | Facility: HOSPITAL | Age: 85
End: 2025-08-17
Payer: MEDICARE

## 2025-09-04 LAB
ANION GAP SERPL CALCULATED.4IONS-SCNC: 11 MMOL/L (CALC) (ref 7–17)
BUN SERPL-MCNC: 43 MG/DL (ref 7–25)
BUN/CREAT SERPL: 11 (CALC) (ref 6–22)
CALCIUM SERPL-MCNC: 9.2 MG/DL (ref 8.6–10.3)
CHLORIDE SERPL-SCNC: 103 MMOL/L (ref 98–110)
CO2 SERPL-SCNC: 28 MMOL/L (ref 20–32)
CREAT SERPL-MCNC: 3.93 MG/DL (ref 0.7–1.22)
EGFRCR SERPLBLD CKD-EPI 2021: 14 ML/MIN/1.73M2
ERYTHROCYTE [DISTWIDTH] IN BLOOD BY AUTOMATED COUNT: 15.4 % (ref 11–15)
GLUCOSE SERPL-MCNC: 145 MG/DL (ref 65–99)
HCT VFR BLD AUTO: 35.8 % (ref 38.5–50)
HGB BLD-MCNC: 11.8 G/DL (ref 13.2–17.1)
MCH RBC QN AUTO: 34.4 PG (ref 27–33)
MCHC RBC AUTO-ENTMCNC: 33 G/DL (ref 32–36)
MCV RBC AUTO: 104.4 FL (ref 80–100)
PLATELET # BLD AUTO: 189 THOUSAND/UL (ref 140–400)
PMV BLD REES-ECKER: 9.9 FL (ref 7.5–12.5)
POTASSIUM SERPL-SCNC: 4.5 MMOL/L (ref 3.5–5.3)
RBC # BLD AUTO: 3.43 MILLION/UL (ref 4.2–5.8)
SODIUM SERPL-SCNC: 142 MMOL/L (ref 135–146)
WBC # BLD AUTO: 4.7 THOUSAND/UL (ref 3.8–10.8)

## 2025-09-30 ENCOUNTER — APPOINTMENT (OUTPATIENT)
Dept: CARDIOLOGY | Facility: HOSPITAL | Age: 85
End: 2025-09-30
Payer: MEDICARE

## 2026-01-22 ENCOUNTER — APPOINTMENT (OUTPATIENT)
Dept: OPHTHALMOLOGY | Facility: CLINIC | Age: 86
End: 2026-01-22
Payer: MEDICARE

## (undated) DEVICE — GUIDEWIRE, INQWIRE, 3MM J, .035 X 210CM, FIXED

## (undated) DEVICE — INTRODUCER SYSTEM, PRELUDE SNAP, SPLITTABLE, HEMOSTATIC, 7FR

## (undated) DEVICE — CATHETER, ACUMEN C315, FIXED, HIS SHAPE

## (undated) DEVICE — SLITTER, ADJUSTABLE